# Patient Record
Sex: FEMALE | Race: WHITE | Employment: OTHER | URBAN - METROPOLITAN AREA
[De-identification: names, ages, dates, MRNs, and addresses within clinical notes are randomized per-mention and may not be internally consistent; named-entity substitution may affect disease eponyms.]

---

## 2017-04-11 ENCOUNTER — OFFICE VISIT (OUTPATIENT)
Dept: FAMILY MEDICINE CLINIC | Age: 82
End: 2017-04-11

## 2017-04-11 VITALS
BODY MASS INDEX: 28.82 KG/M2 | HEART RATE: 72 BPM | HEIGHT: 62 IN | TEMPERATURE: 96.5 F | SYSTOLIC BLOOD PRESSURE: 124 MMHG | RESPIRATION RATE: 12 BRPM | DIASTOLIC BLOOD PRESSURE: 56 MMHG | WEIGHT: 156.6 LBS

## 2017-04-11 DIAGNOSIS — G25.0 BENIGN ESSENTIAL TREMOR: ICD-10-CM

## 2017-04-11 DIAGNOSIS — R27.0 ATAXIA: ICD-10-CM

## 2017-04-11 DIAGNOSIS — G20 PARKINSON'S DISEASE (HCC): ICD-10-CM

## 2017-04-11 DIAGNOSIS — R29.6 FREQUENT FALLS: ICD-10-CM

## 2017-04-11 PROCEDURE — 1090F PRES/ABSN URINE INCON ASSESS: CPT | Performed by: FAMILY MEDICINE

## 2017-04-11 PROCEDURE — 99214 OFFICE O/P EST MOD 30 MIN: CPT | Performed by: FAMILY MEDICINE

## 2017-04-11 PROCEDURE — 4040F PNEUMOC VAC/ADMIN/RCVD: CPT | Performed by: FAMILY MEDICINE

## 2017-04-11 PROCEDURE — G8420 CALC BMI NORM PARAMETERS: HCPCS | Performed by: FAMILY MEDICINE

## 2017-04-11 PROCEDURE — 1123F ACP DISCUSS/DSCN MKR DOCD: CPT | Performed by: FAMILY MEDICINE

## 2017-04-11 PROCEDURE — G8400 PT W/DXA NO RESULTS DOC: HCPCS | Performed by: FAMILY MEDICINE

## 2017-04-11 PROCEDURE — 1036F TOBACCO NON-USER: CPT | Performed by: FAMILY MEDICINE

## 2017-04-11 PROCEDURE — G8427 DOCREV CUR MEDS BY ELIG CLIN: HCPCS | Performed by: FAMILY MEDICINE

## 2017-04-11 RX ORDER — DONEPEZIL HYDROCHLORIDE 10 MG/1
TABLET, FILM COATED ORAL
COMMUNITY
Start: 2017-01-19

## 2017-04-11 RX ORDER — SULFAMETHOXAZOLE AND TRIMETHOPRIM 800; 160 MG/1; MG/1
TABLET ORAL
COMMUNITY
Start: 2017-03-18

## 2017-04-11 ASSESSMENT — PATIENT HEALTH QUESTIONNAIRE - PHQ9
SUM OF ALL RESPONSES TO PHQ QUESTIONS 1-9: 0
1. LITTLE INTEREST OR PLEASURE IN DOING THINGS: 0
SUM OF ALL RESPONSES TO PHQ9 QUESTIONS 1 & 2: 0
2. FEELING DOWN, DEPRESSED OR HOPELESS: 0

## 2017-07-20 ENCOUNTER — OFFICE VISIT (OUTPATIENT)
Dept: FAMILY MEDICINE CLINIC | Age: 82
End: 2017-07-20

## 2017-07-20 VITALS
BODY MASS INDEX: 28.79 KG/M2 | WEIGHT: 157.4 LBS | OXYGEN SATURATION: 96 % | HEART RATE: 62 BPM | SYSTOLIC BLOOD PRESSURE: 132 MMHG | DIASTOLIC BLOOD PRESSURE: 60 MMHG | RESPIRATION RATE: 16 BRPM | TEMPERATURE: 97.9 F

## 2017-07-20 DIAGNOSIS — T78.3XXA ANGIOEDEMA, INITIAL ENCOUNTER: Primary | ICD-10-CM

## 2017-07-20 DIAGNOSIS — T78.40XA ALLERGIC REACTION, INITIAL ENCOUNTER: ICD-10-CM

## 2017-07-20 PROCEDURE — G8419 CALC BMI OUT NRM PARAM NOF/U: HCPCS | Performed by: NURSE PRACTITIONER

## 2017-07-20 PROCEDURE — G8400 PT W/DXA NO RESULTS DOC: HCPCS | Performed by: NURSE PRACTITIONER

## 2017-07-20 PROCEDURE — 96372 THER/PROPH/DIAG INJ SC/IM: CPT | Performed by: NURSE PRACTITIONER

## 2017-07-20 PROCEDURE — 1036F TOBACCO NON-USER: CPT | Performed by: NURSE PRACTITIONER

## 2017-07-20 PROCEDURE — 4040F PNEUMOC VAC/ADMIN/RCVD: CPT | Performed by: NURSE PRACTITIONER

## 2017-07-20 PROCEDURE — 1123F ACP DISCUSS/DSCN MKR DOCD: CPT | Performed by: NURSE PRACTITIONER

## 2017-07-20 PROCEDURE — 99214 OFFICE O/P EST MOD 30 MIN: CPT | Performed by: NURSE PRACTITIONER

## 2017-07-20 PROCEDURE — G8427 DOCREV CUR MEDS BY ELIG CLIN: HCPCS | Performed by: NURSE PRACTITIONER

## 2017-07-20 PROCEDURE — 1090F PRES/ABSN URINE INCON ASSESS: CPT | Performed by: NURSE PRACTITIONER

## 2017-07-20 RX ORDER — DIPHENHYDRAMINE HYDROCHLORIDE 50 MG/ML
25 INJECTION INTRAMUSCULAR; INTRAVENOUS ONCE
Status: COMPLETED | OUTPATIENT
Start: 2017-07-20 | End: 2017-07-20

## 2017-07-20 RX ORDER — TRIAMCINOLONE ACETONIDE 40 MG/ML
80 INJECTION, SUSPENSION INTRA-ARTICULAR; INTRAMUSCULAR ONCE
Status: COMPLETED | OUTPATIENT
Start: 2017-07-20 | End: 2017-07-20

## 2017-07-20 RX ADMIN — TRIAMCINOLONE ACETONIDE 80 MG: 40 INJECTION, SUSPENSION INTRA-ARTICULAR; INTRAMUSCULAR at 16:30

## 2017-07-20 RX ADMIN — DIPHENHYDRAMINE HYDROCHLORIDE 25 MG: 50 INJECTION INTRAMUSCULAR; INTRAVENOUS at 16:45

## 2017-07-20 ASSESSMENT — ENCOUNTER SYMPTOMS
CHOKING: 0
SHORTNESS OF BREATH: 0
STRIDOR: 0
ABDOMINAL PAIN: 0
ALLERGIC REACTION: 1
CHEST TIGHTNESS: 0
TROUBLE SWALLOWING: 0
DIFFICULTY BREATHING: 0
EYE PAIN: 0
EYE ITCHING: 0
DIARRHEA: 0
EYE REDNESS: 0
VOMITING: 0
HYPERVENTILATION: 0
WHEEZING: 0
COUGH: 0
GLOBUS SENSATION: 0
EYE WATERING: 0

## 2017-10-10 ENCOUNTER — OFFICE VISIT (OUTPATIENT)
Dept: FAMILY MEDICINE CLINIC | Age: 82
End: 2017-10-10

## 2017-10-10 VITALS
DIASTOLIC BLOOD PRESSURE: 56 MMHG | HEIGHT: 62 IN | BODY MASS INDEX: 28.19 KG/M2 | TEMPERATURE: 96.8 F | HEART RATE: 65 BPM | OXYGEN SATURATION: 98 % | SYSTOLIC BLOOD PRESSURE: 112 MMHG | WEIGHT: 153.2 LBS

## 2017-10-10 DIAGNOSIS — R29.6 FREQUENT FALLS: ICD-10-CM

## 2017-10-10 DIAGNOSIS — Z23 NEED FOR PNEUMOCOCCAL VACCINATION: ICD-10-CM

## 2017-10-10 DIAGNOSIS — R27.0 ATAXIA: ICD-10-CM

## 2017-10-10 DIAGNOSIS — Z23 NEEDS FLU SHOT: ICD-10-CM

## 2017-10-10 DIAGNOSIS — G20 PARKINSON'S DISEASE (HCC): Primary | ICD-10-CM

## 2017-10-10 PROCEDURE — G8484 FLU IMMUNIZE NO ADMIN: HCPCS | Performed by: FAMILY MEDICINE

## 2017-10-10 PROCEDURE — G8427 DOCREV CUR MEDS BY ELIG CLIN: HCPCS | Performed by: FAMILY MEDICINE

## 2017-10-10 PROCEDURE — 4040F PNEUMOC VAC/ADMIN/RCVD: CPT | Performed by: FAMILY MEDICINE

## 2017-10-10 PROCEDURE — 1090F PRES/ABSN URINE INCON ASSESS: CPT | Performed by: FAMILY MEDICINE

## 2017-10-10 PROCEDURE — 1123F ACP DISCUSS/DSCN MKR DOCD: CPT | Performed by: FAMILY MEDICINE

## 2017-10-10 PROCEDURE — G8417 CALC BMI ABV UP PARAM F/U: HCPCS | Performed by: FAMILY MEDICINE

## 2017-10-10 PROCEDURE — 90662 IIV NO PRSV INCREASED AG IM: CPT | Performed by: FAMILY MEDICINE

## 2017-10-10 PROCEDURE — 1036F TOBACCO NON-USER: CPT | Performed by: FAMILY MEDICINE

## 2017-10-10 PROCEDURE — 90732 PPSV23 VACC 2 YRS+ SUBQ/IM: CPT | Performed by: FAMILY MEDICINE

## 2017-10-10 PROCEDURE — 99213 OFFICE O/P EST LOW 20 MIN: CPT | Performed by: FAMILY MEDICINE

## 2017-10-10 PROCEDURE — G8400 PT W/DXA NO RESULTS DOC: HCPCS | Performed by: FAMILY MEDICINE

## 2017-10-10 PROCEDURE — G0009 ADMIN PNEUMOCOCCAL VACCINE: HCPCS | Performed by: FAMILY MEDICINE

## 2017-10-10 PROCEDURE — G0008 ADMIN INFLUENZA VIRUS VAC: HCPCS | Performed by: FAMILY MEDICINE

## 2017-10-10 RX ORDER — OMEPRAZOLE 40 MG/1
1 CAPSULE, DELAYED RELEASE ORAL DAILY
COMMUNITY
Start: 2017-09-09

## 2017-10-10 NOTE — PROGRESS NOTES
hematuria  Musculoskeletal ROS: negative  Neurological ROS: no TIA or stroke symptoms  Dermatological ROS: negative    Vitals:    10/10/17 1038 10/10/17 1116   BP: 136/78 (!) 112/56   Pulse: 65    Temp: 96.8 °F (36 °C)    TempSrc: Temporal    SpO2: 98%    Weight: 153 lb 3.2 oz (69.5 kg)    Height: 5' 2\" (1.575 m)      Physical Examination: General appearance - alert, well appearing, and in no distress, normal appearing weight and acyanotic, in no respiratory distress. She does walk with a cane. Mental status - alert, oriented to person, place, and time  Neck - supple, no significant adenopathy, carotids upstroke normal bilaterally, no bruits, thyroid exam: thyroid is normal in size without nodules or tenderness  Chest - clear to auscultation, no wheezes, rales or rhonchi, symmetric air entry  Heart - normal rate, regular rhythm, normal S1, S2, no murmurs, rubs, clicks or gallops, systolic murmur 2/6 at 2nd right intercostal space  Abdomen - soft, nontender, nondistended, no masses or organomegaly  bowel sounds normal  Neurological - alert, oriented, normal speech, no focal findings or movement disorder noted, cranial nerves II through XII intact, mild tremor is noted. Extremities - peripheral pulses normal, no pedal edema, no clubbing or cyanosis  Skin - normal coloration and turgor, no rashes, no suspicious skin lesions noted    1. Parkinson's disease ([address]     2. Ataxia     3. Frequent falls     4. Needs flu shot  INFLUENZA, HIGH DOSE, 65 YRS +, IM, PF, PREFILL SYR, 0.5ML (FLUZONE HD)   5. Need for pneumococcal vaccination  Pneumococcal polysaccharide vaccine 23-valent greater than or equal to 1yo subcutaneous/IM       I have reviewed the following diagnostic data: labs. Please see report for additional information. Follow up with neurology as scheduled. Patient was encouraged to continue with exercising on a regular basis. She will continue to use her quad cane for additional support.   Return in about 6 months (around 4/10/2018) for follow up on medications.

## 2017-10-10 NOTE — PROGRESS NOTES
Vaccine Information Sheet, \"Influenza - Inactivated\" OR \"Live - Intranasal\"  given to [facility]. Patient responses:    Have you ever had a reaction to a flu vaccine? No  Are you able to eat eggs without adverse effects? Yes  Do you have any current illness? No  Have you ever had Guillian Hudson Syndrome? No    Flu vaccine given per order. Please see immunization tab.

## 2018-04-23 ENCOUNTER — OFFICE VISIT (OUTPATIENT)
Dept: FAMILY MEDICINE CLINIC | Age: 83
End: 2018-04-23
Payer: MEDICARE

## 2018-04-23 VITALS
OXYGEN SATURATION: 95 % | TEMPERATURE: 97.8 F | WEIGHT: 141.6 LBS | HEIGHT: 62 IN | SYSTOLIC BLOOD PRESSURE: 118 MMHG | DIASTOLIC BLOOD PRESSURE: 66 MMHG | HEART RATE: 69 BPM | BODY MASS INDEX: 26.06 KG/M2

## 2018-04-23 DIAGNOSIS — R33.9 URINARY RETENTION: ICD-10-CM

## 2018-04-23 DIAGNOSIS — Z13.220 SCREENING, LIPID: ICD-10-CM

## 2018-04-23 DIAGNOSIS — G25.0 BENIGN ESSENTIAL TREMOR: ICD-10-CM

## 2018-04-23 DIAGNOSIS — G20 PARKINSON'S DISEASE (HCC): ICD-10-CM

## 2018-04-23 DIAGNOSIS — G20 PARKINSON'S DISEASE (HCC): Primary | ICD-10-CM

## 2018-04-23 LAB
ALBUMIN SERPL-MCNC: 4.5 G/DL (ref 3.9–4.9)
ALP BLD-CCNC: 55 U/L (ref 40–130)
ALT SERPL-CCNC: 13 U/L (ref 0–33)
ANION GAP SERPL CALCULATED.3IONS-SCNC: 14 MEQ/L (ref 7–13)
AST SERPL-CCNC: 20 U/L (ref 0–35)
BASOPHILS ABSOLUTE: 0 K/UL (ref 0–0.2)
BASOPHILS RELATIVE PERCENT: 0.4 %
BILIRUB SERPL-MCNC: 0.6 MG/DL (ref 0–1.2)
BUN BLDV-MCNC: 27 MG/DL (ref 8–23)
CALCIUM SERPL-MCNC: 10 MG/DL (ref 8.6–10.2)
CHLORIDE BLD-SCNC: 101 MEQ/L (ref 98–107)
CHOLESTEROL, TOTAL: 201 MG/DL (ref 0–199)
CO2: 29 MEQ/L (ref 22–29)
CREAT SERPL-MCNC: 0.93 MG/DL (ref 0.5–0.9)
EOSINOPHILS ABSOLUTE: 0.1 K/UL (ref 0–0.7)
EOSINOPHILS RELATIVE PERCENT: 1 %
GFR AFRICAN AMERICAN: >60
GFR NON-AFRICAN AMERICAN: 57.9
GLOBULIN: 2.5 G/DL (ref 2.3–3.5)
GLUCOSE BLD-MCNC: 93 MG/DL (ref 74–109)
HCT VFR BLD CALC: 39.2 % (ref 37–47)
HDLC SERPL-MCNC: 73 MG/DL (ref 40–59)
HEMOGLOBIN: 13.1 G/DL (ref 12–16)
LDL CHOLESTEROL CALCULATED: 118 MG/DL (ref 0–129)
LYMPHOCYTES ABSOLUTE: 1.3 K/UL (ref 1–4.8)
LYMPHOCYTES RELATIVE PERCENT: 25.3 %
MCH RBC QN AUTO: 32.2 PG (ref 27–31.3)
MCHC RBC AUTO-ENTMCNC: 33.5 % (ref 33–37)
MCV RBC AUTO: 95.9 FL (ref 82–100)
MONOCYTES ABSOLUTE: 0.5 K/UL (ref 0.2–0.8)
MONOCYTES RELATIVE PERCENT: 9.6 %
NEUTROPHILS ABSOLUTE: 3.3 K/UL (ref 1.4–6.5)
NEUTROPHILS RELATIVE PERCENT: 63.7 %
PDW BLD-RTO: 15 % (ref 11.5–14.5)
PLATELET # BLD: 149 K/UL (ref 130–400)
POTASSIUM SERPL-SCNC: 5.2 MEQ/L (ref 3.5–5.1)
RBC # BLD: 4.09 M/UL (ref 4.2–5.4)
SODIUM BLD-SCNC: 144 MEQ/L (ref 132–144)
TOTAL PROTEIN: 7 G/DL (ref 6.4–8.1)
TRIGL SERPL-MCNC: 51 MG/DL (ref 0–200)
TSH SERPL DL<=0.05 MIU/L-ACNC: 2.65 UIU/ML (ref 0.27–4.2)
WBC # BLD: 5.1 K/UL (ref 4.8–10.8)

## 2018-04-23 PROCEDURE — G8427 DOCREV CUR MEDS BY ELIG CLIN: HCPCS | Performed by: FAMILY MEDICINE

## 2018-04-23 PROCEDURE — 1123F ACP DISCUSS/DSCN MKR DOCD: CPT | Performed by: FAMILY MEDICINE

## 2018-04-23 PROCEDURE — 1036F TOBACCO NON-USER: CPT | Performed by: FAMILY MEDICINE

## 2018-04-23 PROCEDURE — 99213 OFFICE O/P EST LOW 20 MIN: CPT | Performed by: FAMILY MEDICINE

## 2018-04-23 PROCEDURE — G8417 CALC BMI ABV UP PARAM F/U: HCPCS | Performed by: FAMILY MEDICINE

## 2018-04-23 PROCEDURE — 4040F PNEUMOC VAC/ADMIN/RCVD: CPT | Performed by: FAMILY MEDICINE

## 2018-04-23 PROCEDURE — 1090F PRES/ABSN URINE INCON ASSESS: CPT | Performed by: FAMILY MEDICINE

## 2018-04-23 PROCEDURE — G8400 PT W/DXA NO RESULTS DOC: HCPCS | Performed by: FAMILY MEDICINE

## 2018-04-23 ASSESSMENT — PATIENT HEALTH QUESTIONNAIRE - PHQ9
1. LITTLE INTEREST OR PLEASURE IN DOING THINGS: 0
2. FEELING DOWN, DEPRESSED OR HOPELESS: 0
SUM OF ALL RESPONSES TO PHQ QUESTIONS 1-9: 0
SUM OF ALL RESPONSES TO PHQ9 QUESTIONS 1 & 2: 0

## 2018-10-23 ENCOUNTER — OFFICE VISIT (OUTPATIENT)
Dept: FAMILY MEDICINE CLINIC | Age: 83
End: 2018-10-23
Payer: MEDICARE

## 2018-10-23 VITALS
HEART RATE: 72 BPM | RESPIRATION RATE: 18 BRPM | TEMPERATURE: 97 F | HEIGHT: 62 IN | WEIGHT: 139.4 LBS | BODY MASS INDEX: 25.65 KG/M2 | DIASTOLIC BLOOD PRESSURE: 62 MMHG | SYSTOLIC BLOOD PRESSURE: 112 MMHG

## 2018-10-23 DIAGNOSIS — R27.0 ATAXIA: ICD-10-CM

## 2018-10-23 DIAGNOSIS — N28.9 RENAL INSUFFICIENCY: ICD-10-CM

## 2018-10-23 DIAGNOSIS — R29.6 FREQUENT FALLS: ICD-10-CM

## 2018-10-23 DIAGNOSIS — G20 PARKINSON'S DISEASE (HCC): Primary | ICD-10-CM

## 2018-10-23 DIAGNOSIS — Z23 NEEDS FLU SHOT: ICD-10-CM

## 2018-10-23 DIAGNOSIS — Z91.81 AT HIGH RISK FOR FALLS: ICD-10-CM

## 2018-10-23 DIAGNOSIS — Z78.0 POSTMENOPAUSAL: ICD-10-CM

## 2018-10-23 DIAGNOSIS — Z23 NEED FOR SHINGLES VACCINE: ICD-10-CM

## 2018-10-23 DIAGNOSIS — Z13.220 SCREENING, LIPID: ICD-10-CM

## 2018-10-23 LAB
ANION GAP SERPL CALCULATED.3IONS-SCNC: 10 MEQ/L (ref 7–13)
BUN BLDV-MCNC: 29 MG/DL (ref 8–23)
CALCIUM SERPL-MCNC: 9.7 MG/DL (ref 8.6–10.2)
CHLORIDE BLD-SCNC: 104 MEQ/L (ref 98–107)
CO2: 31 MEQ/L (ref 22–29)
CREAT SERPL-MCNC: 0.94 MG/DL (ref 0.5–0.9)
GFR AFRICAN AMERICAN: >60
GFR NON-AFRICAN AMERICAN: 57.2
GLUCOSE BLD-MCNC: 97 MG/DL (ref 74–109)
POTASSIUM SERPL-SCNC: 5.3 MEQ/L (ref 3.5–5.1)
SODIUM BLD-SCNC: 145 MEQ/L (ref 132–144)

## 2018-10-23 PROCEDURE — G8400 PT W/DXA NO RESULTS DOC: HCPCS | Performed by: FAMILY MEDICINE

## 2018-10-23 PROCEDURE — G0008 ADMIN INFLUENZA VIRUS VAC: HCPCS | Performed by: FAMILY MEDICINE

## 2018-10-23 PROCEDURE — 1101F PT FALLS ASSESS-DOCD LE1/YR: CPT | Performed by: FAMILY MEDICINE

## 2018-10-23 PROCEDURE — 1090F PRES/ABSN URINE INCON ASSESS: CPT | Performed by: FAMILY MEDICINE

## 2018-10-23 PROCEDURE — 4040F PNEUMOC VAC/ADMIN/RCVD: CPT | Performed by: FAMILY MEDICINE

## 2018-10-23 PROCEDURE — 1123F ACP DISCUSS/DSCN MKR DOCD: CPT | Performed by: FAMILY MEDICINE

## 2018-10-23 PROCEDURE — 99214 OFFICE O/P EST MOD 30 MIN: CPT | Performed by: FAMILY MEDICINE

## 2018-10-23 PROCEDURE — G8417 CALC BMI ABV UP PARAM F/U: HCPCS | Performed by: FAMILY MEDICINE

## 2018-10-23 PROCEDURE — 1036F TOBACCO NON-USER: CPT | Performed by: FAMILY MEDICINE

## 2018-10-23 PROCEDURE — 90662 IIV NO PRSV INCREASED AG IM: CPT | Performed by: FAMILY MEDICINE

## 2018-10-23 PROCEDURE — G8482 FLU IMMUNIZE ORDER/ADMIN: HCPCS | Performed by: FAMILY MEDICINE

## 2018-10-23 PROCEDURE — G8427 DOCREV CUR MEDS BY ELIG CLIN: HCPCS | Performed by: FAMILY MEDICINE

## 2018-10-23 ASSESSMENT — ENCOUNTER SYMPTOMS
SHORTNESS OF BREATH: 0
WHEEZING: 0
COLOR CHANGE: 0
RHINORRHEA: 0
CONSTIPATION: 0
SINUS PAIN: 0
DIARRHEA: 0
COUGH: 0
SORE THROAT: 0
ABDOMINAL PAIN: 0
CHEST TIGHTNESS: 0
NAUSEA: 0
BLOOD IN STOOL: 0
VOMITING: 0
VOICE CHANGE: 0
TROUBLE SWALLOWING: 0

## 2018-10-23 NOTE — PROGRESS NOTES
Chief Complaint   Patient presents with    Follow-up     LOV 04/23/2018    Other     Parkinson's Disease    Tremors    Urinary Retention    Discuss Medications     HPI: Mia Gonsalez is a [de-identified] y.o. female presenting for follow-up of Parkinson's Disease, Tremors, and Urinary Retention. I last saw the patient 04/23/2018. She did finish a round of PT for balance and strengthening. She has had no falls since her last visit. She is urinating fine. She is seeing Dr. Mara Gerard with [id number]. She is eating well and does have a good appetite. Current Outpatient Prescriptions on File Prior to Visit   Medication Sig Dispense Refill    omeprazole (PRILOSEC) 40 MG delayed release capsule Take 1 tablet by mouth daily      sulfamethoxazole-trimethoprim (BACTRIM DS;SEPTRA DS) 800-160 MG per tablet       donepezil (ARICEPT) 10 MG tablet       trospium chloride (SANCTURA XR) 60 MG CP24 Take 60 mg by mouth daily      Ivermectin 1 % CREA Apply topically      Multiple Vitamin (MULTI-VITAMIN DAILY PO) Take by mouth      Calcium Citrate-Vitamin D (CITRACAL/VITAMIN D PO) Take by mouth      terazosin (HYTRIN) 2 MG capsule Take 2 mg by mouth nightly      clonazePAM (KLONOPIN) 0.5 MG tablet Take 0.25 mg by mouth 4 times daily       No current facility-administered medications on file prior to visit. No past medical history on file. Past Surgical History:   Procedure Laterality Date    CHOLECYSTECTOMY      FOOT SURGERY      INCONTINENCE SURGERY  6-15-15    Dr. Willams Bruning  04/24/12    DR Angie De Anda       family history includes Heart Disease in her father; High Blood Pressure in her father and sister. Social History     Social History    Marital status:      Spouse name: N/A    Number of children: N/A    Years of education: N/A     Occupational History    Not on file.      Social History Main Topics    Smoking status: Never Smoker    Smokeless tobacco: Never Used

## 2019-02-11 ENCOUNTER — TELEPHONE (OUTPATIENT)
Dept: FAMILY MEDICINE CLINIC | Age: 84
End: 2019-02-11

## 2019-03-04 ENCOUNTER — TELEPHONE (OUTPATIENT)
Dept: ADMINISTRATIVE | Age: 84
End: 2019-03-04

## 2019-04-02 ENCOUNTER — TELEPHONE (OUTPATIENT)
Dept: ADMINISTRATIVE | Age: 84
End: 2019-04-02

## 2023-05-30 PROBLEM — R32 URINARY INCONTINENCE: Status: ACTIVE | Noted: 2023-05-30

## 2023-05-30 PROBLEM — R39.15 URGENCY OF URINATION: Status: ACTIVE | Noted: 2023-05-30

## 2023-05-30 PROBLEM — D53.9 MACROCYTIC ANEMIA: Status: ACTIVE | Noted: 2023-05-30

## 2023-05-30 PROBLEM — E78.00 PURE HYPERCHOLESTEROLEMIA: Status: ACTIVE | Noted: 2023-05-30

## 2023-05-30 PROBLEM — R33.9 URINARY RETENTION: Status: ACTIVE | Noted: 2023-05-30

## 2023-05-30 PROBLEM — H00.012 HORDEOLUM EXTERNUM OF RIGHT LOWER EYELID: Status: ACTIVE | Noted: 2023-05-30

## 2023-05-30 PROBLEM — G20.C PARKINSONISM (MULTI): Status: ACTIVE | Noted: 2023-05-30

## 2023-05-30 PROBLEM — N18.31 CHRONIC KIDNEY DISEASE, STAGE 3A (MULTI): Status: ACTIVE | Noted: 2023-05-30

## 2023-05-30 PROBLEM — N28.1 RENAL CYSTS, ACQUIRED, BILATERAL: Status: ACTIVE | Noted: 2023-05-30

## 2023-05-30 PROBLEM — R26.9 ABNORMALITY OF GAIT AND MOBILITY: Status: ACTIVE | Noted: 2023-05-30

## 2023-05-30 PROBLEM — S20.229A CONTUSION OF BACK, INITIAL ENCOUNTER: Status: ACTIVE | Noted: 2023-05-30

## 2023-05-30 PROBLEM — N28.9 RENAL INSUFFICIENCY, MILD: Status: ACTIVE | Noted: 2023-05-30

## 2023-05-30 PROBLEM — R55 SYNCOPE: Status: ACTIVE | Noted: 2023-05-30

## 2023-05-30 PROBLEM — R41.89 COGNITIVE DECLINE: Status: ACTIVE | Noted: 2023-05-30

## 2023-05-30 PROBLEM — K21.9 GASTROESOPHAGEAL REFLUX DISEASE WITHOUT ESOPHAGITIS: Status: ACTIVE | Noted: 2023-05-30

## 2023-05-30 PROBLEM — F41.9 ANXIETY DISORDER: Status: ACTIVE | Noted: 2023-05-30

## 2023-05-30 PROBLEM — F03.90 DEMENTIA (MULTI): Status: ACTIVE | Noted: 2023-05-30

## 2023-05-30 PROBLEM — R35.1 NOCTURIA: Status: ACTIVE | Noted: 2023-05-30

## 2023-05-30 PROBLEM — N39.41 URGE INCONTINENCE OF URINE: Status: ACTIVE | Noted: 2023-05-30

## 2023-05-30 PROBLEM — N39.0 URINARY TRACT INFECTION: Status: ACTIVE | Noted: 2023-05-30

## 2023-05-30 PROBLEM — R30.0 DYSURIA: Status: ACTIVE | Noted: 2023-05-30

## 2023-05-30 PROBLEM — N32.89 BLADDER INSTABILITY: Status: ACTIVE | Noted: 2023-05-30

## 2023-05-30 PROBLEM — R41.3 MEMORY LOSS, SHORT TERM: Status: ACTIVE | Noted: 2023-05-30

## 2023-05-30 RX ORDER — TROSPIUM CHLORIDE ER 60 MG/1
CAPSULE ORAL
COMMUNITY
Start: 2021-11-05 | End: 2023-12-30

## 2023-05-30 RX ORDER — MIRABEGRON 50 MG/1
1 TABLET, EXTENDED RELEASE ORAL DAILY
COMMUNITY
Start: 2022-08-25

## 2023-05-30 RX ORDER — OMEPRAZOLE 40 MG/1
CAPSULE, DELAYED RELEASE ORAL
COMMUNITY
Start: 2014-09-03

## 2023-05-30 RX ORDER — SULFAMETHOXAZOLE AND TRIMETHOPRIM 800; 160 MG/1; MG/1
TABLET ORAL
COMMUNITY
Start: 2022-06-15 | End: 2024-02-12

## 2023-05-30 RX ORDER — DONEPEZIL HYDROCHLORIDE 10 MG/1
1 TABLET, FILM COATED ORAL DAILY
COMMUNITY
Start: 2016-10-26 | End: 2024-01-02

## 2023-05-30 RX ORDER — CARBIDOPA AND LEVODOPA 25; 100 MG/1; MG/1
TABLET ORAL
COMMUNITY
Start: 2021-11-29 | End: 2023-12-15

## 2023-05-31 NOTE — PROGRESS NOTES
"Subjective   Patient ID: Nedra Quiles is a 87 y.o. female who presents for Dementia, GERD, Chronic Kidney Disease, and Follow-up.  I last saw the patient on 12/1/2022. Her daughter is with her today.     HPI   Patient has no medical concerns today.    She does at least 1 protein powder drink daily to try and keep her weight stable.     Review of Systems  Except positives as noted in the CC & HPI      Constitutional: Denies fevers, chills, night sweats, fatigue, weight changes, change in appetite    Eyes: Denies blurry vision, double vision    ENT: Denies otalgia, trouble hearing, tinnitus, vertigo, nasal congestion, rhinorrhea, sore throat    Neck: Denies swelling, masses    Cardiovascular: Denies chest pain, palpitations, edema, orthopnea, syncope    Respiratory: Denies dyspnea, cough, wheezing, postural nocturnal dyspnea    Gastrointestinal: Denies abdominal pain, nausea, vomiting, diarrhea, constipation, melena, hematochezia    Genitourinary: Denies dysuria, hematuria, frequency, urgency    Musculoskeletal: Denies back pain, neck pain, arthralgias, myalgias    Integumentary: Denies skin lesions, rashes, masses    Neurological: Denies dizziness, headaches, confusion, limb weakness, paresthesias, syncope, convulsions    Psychiatric: Denies depression, anxiety, homicidal ideations, suicidal ideations, sleep disturbances    Endocrine: Denies polyphagia, polydipsia, polyuria, weakness, hair thinning, heat intolerance, cold intolerance, weight changes    Heme/Lymph: Denies easy bruising, easy bleeding, swollen glands     Objective   /62 (BP Location: Right arm, Patient Position: Sitting)   Temp 36 °C (96.8 °F)   Resp 16   Ht 1.575 m (5' 2\")   Wt (!) 44.6 kg (98 lb 6.4 oz)   BMI 18.00 kg/m²     Physical Exam  138/62 on recheck of BP in the right arm.     Gen. Appearance - well-developed, thin, 87 y.o., White female in no acute distress. Patient is mildly unsteady on her feet.     Skin - warm, pink and dry " without rash or concerning lesions.    Mental Status - alert and oriented times 3. Normal mood and affect appropriate to mood.     Neck - supple without lymphadenopathy. Carotid pulses are normal without bruits. Thyroid is normal in midline without nodules.    Chest - lungs are clear to auscultation without rales, rhonchi or wheezes.    Heart - regular, rate, and rhythm without murmurs, rubs or gallops. Grade 1/6 systolic ejection murmur heard best at right sternal border, second intercostal space.     Abdomen - soft, scaphoid, nontender, nondistended. No masses, hepatomegaly or splenomegaly is noted. No rebound, rigidity or guarding is noted. Bowel sounds are normoactive.     Extremities - no cyanosis, clubbing or edema. Pedal pulses are 2+ normal at the dorsalis pedis and posterior tibial pulses bilaterally.     Neurological - cranial nerves II through XII are grossly intact. Motor strength 5/5 at all fours.     Assessment/Plan   1. Moderate dementia without behavioral disturbance, psychotic disturbance, mood disturbance, or anxiety, unspecified dementia type (CMS/HCC)  Follow Up In [facility] - PCP      2. Parkinson's disease (CMS/HCC)  Follow Up In [facility] - PCP      3. Gastroesophageal reflux disease without esophagitis        4. Chronic kidney disease, stage 3a        5. Generalized anxiety disorder  Follow Up In [facility] - PCP      6. Pure hypercholesterolemia        7. Postmenopausal  XR DEXA bone density      8. Mixed stress and urge urinary incontinence        9. Protein-calorie malnutrition, unspecified severity (CMS/HCC)        Patient to continue current medications (with any exceptions as noted) and diet. Follow-up in 6 month(s) otherwise as needed.      DEXA scan ordered to screen for osteopenia/osteoporosis.     Patient is to follow up with Dia Solis (urology CNP), Dr. Marinelli (neuro) as scheduled.     Recommend patient get Shingrix vaccine at local pharmacy.  Shingrix is a 2-step vaccine. You will receive the second dose 2-6 months after the first. This vaccine is 90-95% effective and you should not need another dose in your lifetime. It is recommended by CDC for everyone over the age of 50.         Scribe Attestation  By signing my name below, IAshley Scribe   attest that this documentation has been prepared under the direction and in the presence of Boston Peters MD.

## 2023-06-01 ENCOUNTER — OFFICE VISIT (OUTPATIENT)
Dept: PRIMARY CARE | Facility: CLINIC | Age: 88
End: 2023-06-01
Payer: MEDICARE

## 2023-06-01 VITALS
BODY MASS INDEX: 18.11 KG/M2 | WEIGHT: 98.4 LBS | DIASTOLIC BLOOD PRESSURE: 62 MMHG | SYSTOLIC BLOOD PRESSURE: 138 MMHG | HEIGHT: 62 IN | TEMPERATURE: 96.8 F | RESPIRATION RATE: 16 BRPM

## 2023-06-01 DIAGNOSIS — F03.B0 MODERATE DEMENTIA WITHOUT BEHAVIORAL DISTURBANCE, PSYCHOTIC DISTURBANCE, MOOD DISTURBANCE, OR ANXIETY, UNSPECIFIED DEMENTIA TYPE (MULTI): Primary | ICD-10-CM

## 2023-06-01 DIAGNOSIS — E78.00 PURE HYPERCHOLESTEROLEMIA: ICD-10-CM

## 2023-06-01 DIAGNOSIS — K21.9 GASTROESOPHAGEAL REFLUX DISEASE WITHOUT ESOPHAGITIS: ICD-10-CM

## 2023-06-01 DIAGNOSIS — G20.A1 PARKINSON'S DISEASE (MULTI): ICD-10-CM

## 2023-06-01 DIAGNOSIS — Z78.0 POSTMENOPAUSAL: ICD-10-CM

## 2023-06-01 DIAGNOSIS — N39.46 MIXED STRESS AND URGE URINARY INCONTINENCE: ICD-10-CM

## 2023-06-01 DIAGNOSIS — F41.1 GENERALIZED ANXIETY DISORDER: ICD-10-CM

## 2023-06-01 DIAGNOSIS — N18.31 CHRONIC KIDNEY DISEASE, STAGE 3A (MULTI): ICD-10-CM

## 2023-06-01 DIAGNOSIS — E46 PROTEIN-CALORIE MALNUTRITION, UNSPECIFIED SEVERITY (MULTI): ICD-10-CM

## 2023-06-01 PROCEDURE — 99213 OFFICE O/P EST LOW 20 MIN: CPT | Performed by: FAMILY MEDICINE

## 2023-06-01 PROCEDURE — 1036F TOBACCO NON-USER: CPT | Performed by: FAMILY MEDICINE

## 2023-06-01 PROCEDURE — 1170F FXNL STATUS ASSESSED: CPT | Performed by: FAMILY MEDICINE

## 2023-06-01 PROCEDURE — 1159F MED LIST DOCD IN RCRD: CPT | Performed by: FAMILY MEDICINE

## 2023-06-01 PROCEDURE — 1126F AMNT PAIN NOTED NONE PRSNT: CPT | Performed by: FAMILY MEDICINE

## 2023-06-01 ASSESSMENT — LIFESTYLE VARIABLES
HOW OFTEN DO YOU HAVE SIX OR MORE DRINKS ON ONE OCCASION: NEVER
HOW MANY STANDARD DRINKS CONTAINING ALCOHOL DO YOU HAVE ON A TYPICAL DAY: PATIENT DOES NOT DRINK
AUDIT-C TOTAL SCORE: 0
SKIP TO QUESTIONS 9-10: 1
HOW OFTEN DO YOU HAVE A DRINK CONTAINING ALCOHOL: NEVER

## 2023-06-01 ASSESSMENT — ENCOUNTER SYMPTOMS
DEPRESSION: 0
OCCASIONAL FEELINGS OF UNSTEADINESS: 0
LOSS OF SENSATION IN FEET: 0

## 2023-06-01 ASSESSMENT — ACTIVITIES OF DAILY LIVING (ADL)
GROCERY_SHOPPING: INDEPENDENT
MANAGING_FINANCES: INDEPENDENT
BATHING: INDEPENDENT
TAKING_MEDICATION: INDEPENDENT
DOING_HOUSEWORK: INDEPENDENT
DRESSING: INDEPENDENT

## 2023-06-01 ASSESSMENT — PATIENT HEALTH QUESTIONNAIRE - PHQ9
SUM OF ALL RESPONSES TO PHQ9 QUESTIONS 1 & 2: 0
1. LITTLE INTEREST OR PLEASURE IN DOING THINGS: NOT AT ALL
2. FEELING DOWN, DEPRESSED OR HOPELESS: NOT AT ALL

## 2023-06-01 NOTE — ASSESSMENT & PLAN NOTE
Condition is stable. Patient is to continue current medications and regimen. Patient is to follow up with neurology to monitor his/her condition at least once annually.

## 2023-06-01 NOTE — PATIENT INSTRUCTIONS
Patient to continue current medications (with any exceptions as noted) and diet. Follow-up in 6 month(s) otherwise as needed.      DEXA scan ordered to screen for osteopenia/osteoporosis.     Patient is to follow up with Dia Solis (urology CNP), Dr. Marinelli (neuro) as scheduled.     Recommend patient get Shingrix vaccine at local pharmacy. Shingrix is a 2-step vaccine. You will receive the second dose 2-6 months after the first. This vaccine is 90-95% effective and you should not need another dose in your lifetime. It is recommended by CDC for everyone over the age of 50.

## 2023-07-24 NOTE — RESULT ENCOUNTER NOTE
Please call the patient regarding her abnormal result.  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 Recommend the patient take calcium 600 mg by mouth twice a day and vitamin D 1000 international units daily. Patient should also consider Fosamax, Actonel, Boniva or Reclast to help reduce future fracture risk.

## 2023-08-14 DIAGNOSIS — M85.852 OSTEOPENIA OF BOTH HIPS: Primary | ICD-10-CM

## 2023-08-14 DIAGNOSIS — M85.851 OSTEOPENIA OF BOTH HIPS: Primary | ICD-10-CM

## 2023-08-23 DIAGNOSIS — M85.80 OSTEOPENIA, UNSPECIFIED LOCATION: Primary | ICD-10-CM

## 2023-08-23 DIAGNOSIS — M85.852 OSTEOPENIA OF BOTH HIPS: Primary | ICD-10-CM

## 2023-08-23 DIAGNOSIS — M85.851 OSTEOPENIA OF BOTH HIPS: Primary | ICD-10-CM

## 2023-08-23 RX ORDER — ALENDRONATE SODIUM 70 MG/1
70 TABLET ORAL
COMMUNITY
End: 2023-08-23 | Stop reason: SDUPTHER

## 2023-08-23 RX ORDER — ALENDRONATE SODIUM 70 MG/1
70 TABLET ORAL
Qty: 12 TABLET | Refills: 0 | Status: CANCELLED | OUTPATIENT
Start: 2023-08-23 | End: 2023-11-21

## 2023-08-23 RX ORDER — ALENDRONATE SODIUM 70 MG/1
70 TABLET ORAL
Qty: 12 TABLET | Refills: 0 | Status: SHIPPED | OUTPATIENT
Start: 2023-08-23 | End: 2023-11-15

## 2023-12-01 ENCOUNTER — LAB (OUTPATIENT)
Dept: LAB | Facility: LAB | Age: 88
End: 2023-12-01
Payer: MEDICARE

## 2023-12-01 ENCOUNTER — OFFICE VISIT (OUTPATIENT)
Dept: PRIMARY CARE | Facility: CLINIC | Age: 88
End: 2023-12-01
Payer: MEDICARE

## 2023-12-01 VITALS
WEIGHT: 100.2 LBS | SYSTOLIC BLOOD PRESSURE: 124 MMHG | OXYGEN SATURATION: 97 % | BODY MASS INDEX: 18.44 KG/M2 | TEMPERATURE: 97.8 F | DIASTOLIC BLOOD PRESSURE: 52 MMHG | HEART RATE: 57 BPM | HEIGHT: 62 IN

## 2023-12-01 DIAGNOSIS — F41.1 GENERALIZED ANXIETY DISORDER: ICD-10-CM

## 2023-12-01 DIAGNOSIS — K21.9 GASTROESOPHAGEAL REFLUX DISEASE WITHOUT ESOPHAGITIS: ICD-10-CM

## 2023-12-01 DIAGNOSIS — G20.A1 PARKINSON'S DISEASE, UNSPECIFIED WHETHER DYSKINESIA PRESENT, UNSPECIFIED WHETHER MANIFESTATIONS FLUCTUATE (MULTI): ICD-10-CM

## 2023-12-01 DIAGNOSIS — N18.31 CHRONIC KIDNEY DISEASE, STAGE 3A (MULTI): ICD-10-CM

## 2023-12-01 DIAGNOSIS — R19.7 DIARRHEA, UNSPECIFIED TYPE: Primary | ICD-10-CM

## 2023-12-01 DIAGNOSIS — E46 PROTEIN-CALORIE MALNUTRITION, UNSPECIFIED SEVERITY (MULTI): ICD-10-CM

## 2023-12-01 DIAGNOSIS — E78.00 PURE HYPERCHOLESTEROLEMIA: ICD-10-CM

## 2023-12-01 DIAGNOSIS — R19.7 DIARRHEA, UNSPECIFIED TYPE: ICD-10-CM

## 2023-12-01 DIAGNOSIS — F03.B0 MODERATE DEMENTIA WITHOUT BEHAVIORAL DISTURBANCE, PSYCHOTIC DISTURBANCE, MOOD DISTURBANCE, OR ANXIETY, UNSPECIFIED DEMENTIA TYPE (MULTI): ICD-10-CM

## 2023-12-01 PROBLEM — N39.0 URINARY TRACT INFECTION: Status: RESOLVED | Noted: 2023-05-30 | Resolved: 2023-12-01

## 2023-12-01 LAB
ALBUMIN SERPL BCP-MCNC: 4.6 G/DL (ref 3.4–5)
ALP SERPL-CCNC: 47 U/L (ref 33–136)
ALT SERPL W P-5'-P-CCNC: 12 U/L (ref 7–45)
ANION GAP SERPL CALC-SCNC: 9 MMOL/L (ref 10–20)
AST SERPL W P-5'-P-CCNC: 26 U/L (ref 9–39)
BASOPHILS # BLD AUTO: 0.04 X10*3/UL (ref 0–0.1)
BASOPHILS NFR BLD AUTO: 0.7 %
BILIRUB SERPL-MCNC: 0.6 MG/DL (ref 0–1.2)
BUN SERPL-MCNC: 28 MG/DL (ref 6–23)
CALCIUM SERPL-MCNC: 9.7 MG/DL (ref 8.6–10.3)
CHLORIDE SERPL-SCNC: 102 MMOL/L (ref 98–107)
CHOLEST SERPL-MCNC: 170 MG/DL (ref 0–199)
CHOLESTEROL/HDL RATIO: 2
CO2 SERPL-SCNC: 33 MMOL/L (ref 21–32)
CREAT SERPL-MCNC: 0.93 MG/DL (ref 0.5–1.05)
EOSINOPHIL # BLD AUTO: 0.07 X10*3/UL (ref 0–0.4)
EOSINOPHIL NFR BLD AUTO: 1.2 %
ERYTHROCYTE [DISTWIDTH] IN BLOOD BY AUTOMATED COUNT: 13.3 % (ref 11.5–14.5)
ERYTHROCYTE [SEDIMENTATION RATE] IN BLOOD BY WESTERGREN METHOD: 10 MM/H (ref 0–30)
GFR SERPL CREATININE-BSD FRML MDRD: 60 ML/MIN/1.73M*2
GLUCOSE SERPL-MCNC: 85 MG/DL (ref 74–99)
HCT VFR BLD AUTO: 40.1 % (ref 36–46)
HDLC SERPL-MCNC: 83.9 MG/DL
HGB BLD-MCNC: 12.4 G/DL (ref 12–16)
IMM GRANULOCYTES # BLD AUTO: 0.02 X10*3/UL (ref 0–0.5)
IMM GRANULOCYTES NFR BLD AUTO: 0.3 % (ref 0–0.9)
LDLC SERPL CALC-MCNC: 78 MG/DL
LYMPHOCYTES # BLD AUTO: 1.53 X10*3/UL (ref 0.8–3)
LYMPHOCYTES NFR BLD AUTO: 25.2 %
MCH RBC QN AUTO: 31.2 PG (ref 26–34)
MCHC RBC AUTO-ENTMCNC: 30.9 G/DL (ref 32–36)
MCV RBC AUTO: 101 FL (ref 80–100)
MONOCYTES # BLD AUTO: 0.71 X10*3/UL (ref 0.05–0.8)
MONOCYTES NFR BLD AUTO: 11.7 %
NEUTROPHILS # BLD AUTO: 3.7 X10*3/UL (ref 1.6–5.5)
NEUTROPHILS NFR BLD AUTO: 60.9 %
NON HDL CHOLESTEROL: 86 MG/DL (ref 0–149)
NRBC BLD-RTO: 0 /100 WBCS (ref 0–0)
PLATELET # BLD AUTO: 183 X10*3/UL (ref 150–450)
POTASSIUM SERPL-SCNC: 4.4 MMOL/L (ref 3.5–5.3)
PROT SERPL-MCNC: 7.1 G/DL (ref 6.4–8.2)
RBC # BLD AUTO: 3.97 X10*6/UL (ref 4–5.2)
SODIUM SERPL-SCNC: 140 MMOL/L (ref 136–145)
TRIGL SERPL-MCNC: 41 MG/DL (ref 0–149)
TSH SERPL-ACNC: 2.38 MIU/L (ref 0.44–3.98)
VLDL: 8 MG/DL (ref 0–40)
WBC # BLD AUTO: 6.1 X10*3/UL (ref 4.4–11.3)

## 2023-12-01 PROCEDURE — 99214 OFFICE O/P EST MOD 30 MIN: CPT | Performed by: FAMILY MEDICINE

## 2023-12-01 PROCEDURE — 80061 LIPID PANEL: CPT

## 2023-12-01 PROCEDURE — 85025 COMPLETE CBC W/AUTO DIFF WBC: CPT

## 2023-12-01 PROCEDURE — 84443 ASSAY THYROID STIM HORMONE: CPT

## 2023-12-01 PROCEDURE — 1036F TOBACCO NON-USER: CPT | Performed by: FAMILY MEDICINE

## 2023-12-01 PROCEDURE — 85652 RBC SED RATE AUTOMATED: CPT

## 2023-12-01 PROCEDURE — 1159F MED LIST DOCD IN RCRD: CPT | Performed by: FAMILY MEDICINE

## 2023-12-01 PROCEDURE — 80053 COMPREHEN METABOLIC PANEL: CPT

## 2023-12-01 PROCEDURE — 36415 COLL VENOUS BLD VENIPUNCTURE: CPT

## 2023-12-01 PROCEDURE — 1126F AMNT PAIN NOTED NONE PRSNT: CPT | Performed by: FAMILY MEDICINE

## 2023-12-01 NOTE — PROGRESS NOTES
Subjective   Patient ID: Nedra Quiles is a 88 y.o. female who presents for Anxiety, GERD, and Dementia. I last saw the patient 6/1/2023. Patient's daughter is with her today.     HPI   Patient is having issues with diarrhea. This happens daily in the am. She does drink orange juice every morning. She does have a fiber + protein shake daily, around 11 am. Discuss doubling it. She has noticed some black tarry stools as well. Her appetite is still good with no swallowing issues. She rarely has solid BMs.     The have noticed that her right knee is turning in more as she walks. She does not feel unsteady. She uses a walker at home.     She will get a RSV vaccine from pharmacy.     Review of Systems  Except positives as noted in the CC & HPI      Constitutional: Denies fevers, chills, night sweats, fatigue, weight changes, change in appetite    Eyes: Denies blurry vision, double vision    ENT: Denies otalgia, trouble hearing, tinnitus, vertigo, nasal congestion, rhinorrhea, sore throat    Neck: Denies swelling, masses    Cardiovascular: Denies chest pain, palpitations, edema, orthopnea, syncope    Respiratory: Denies dyspnea, cough, wheezing, postural nocturnal dyspnea    Gastrointestinal: Denies abdominal pain, nausea, vomiting, constipation, melena, hematochezia    Genitourinary: Denies dysuria, hematuria, frequency, urgency    Musculoskeletal: Denies back pain, neck pain, myalgias    Integumentary: Denies skin lesions, rashes, masses    Neurological: Denies dizziness, headaches, confusion, limb weakness, paresthesias, syncope, convulsions    Psychiatric: Denies depression, anxiety, homicidal ideations, suicidal ideations, sleep disturbances    Endocrine: Denies polyphagia, polydipsia, polyuria, weakness, hair thinning, heat intolerance, cold intolerance, weight changes    Heme/Lymph: Denies easy bruising, easy bleeding, swollen glands    Objective   /52 (BP Location: Right arm, Patient Position: Sitting)    "Pulse 57   Temp 36.6 °C (97.8 °F) (Temporal)   Ht 1.575 m (5' 2\")   Wt 45.5 kg (100 lb 3.2 oz)   SpO2 97%   BMI 18.33 kg/m²     Physical Exam  124/52 on recheck of BP in the right arm.     Gen. Appearance - well-developed, well-nourished, 88 y.o., White female in no acute distress. Patient is mildly unsteady on her feet. She does walk with a Hurri-cane.     Skin - warm, pink and dry without rash or concerning lesions.    Mental Status - alert and oriented times 3. Normal mood and affect appropriate to mood.     Neck - supple without lymphadenopathy. Carotid pulses are normal without bruits. Thyroid is normal in midline without nodules.    Chest - lungs are clear to auscultation without rales, rhonchi or wheezes.    Heart - regular, rate, and rhythm without murmurs, rubs or gallops. Grade 1/6 systolic ejection murmur heard best at right sternal border, second intercostal space.     Abdomen - soft, scaphoid, nontender, nondistended. No masses, hepatomegaly or splenomegaly is noted. No rebound, rigidity or guarding is noted. Bowel sounds are normoactive.     Knee - Normal visual inspection, no erythema, warmth, or effusion. Normal knee alignment. Range of motion to flexion to 150° and extension to 0° of right knee. Range of motion to flexion to 170° and extension to 0° of left knee. Negative Lachman, anterior drawer test, valgus and varus stress testing. Shankar's testing, patellar apprehension. No tenderness to palpation of MCL, LCL, medial joint line, lateral joint line, patellar tendon, quadriceps tendon, or pes bursa. Sensation intact. Muscle strength +5/5. Gait normal. Normal range of motion of hips. Varus deformity of the right knee.     Extremities - no cyanosis, clubbing or edema. Pedal pulses are 2+ normal at the dorsalis pedis and posterior tibial pulses bilaterally.     Neurological - cranial nerves II through XII are grossly intact. Motor strength 5/5 at all fours.     Assessment/Plan   1. Diarrhea, " unspecified type  Referral to Gastroenterology    TSH with reflex to Free T4 if abnormal    Sedimentation Rate      2. Moderate dementia without behavioral disturbance, psychotic disturbance, mood disturbance, or anxiety, unspecified dementia type (CMS/HCC)  Follow Up In [facility] - PCP    Follow Up In [facility] - PCP - Established      3. Parkinson's disease, unspecified whether dyskinesia present, unspecified whether manifestations fluctuate  Follow Up In [facility] - PCP    Follow Up In [facility] - PCP - Established      4. Generalized anxiety disorder  Follow Up In [facility] - PCP    Follow Up In [facility]  PCP - Established      5. Pure hypercholesterolemia  Lipid Panel      6. Gastroesophageal reflux disease without esophagitis        7. Chronic kidney disease, stage 3a (CMS/HCC)  Comprehensive Metabolic Panel    Follow Up In [facility] - PCP - Established      8. Body mass index (BMI) less than 19  CBC and Auto Differential    TSH with reflex to Free T4 if abnormal      9. Protein-calorie malnutrition, unspecified severity (CMS/HCC)        Patient to continue current medications (with any exceptions as noted) and diet. Follow-up in 6 month(s) otherwise as needed.      Patient is to complete fasting CBC, CMP, lipid panel, TSH, sed rate labs today. Will call patient with results when available.     Refer patient to Dr. Rain for further evaluation of diarrhea, colonoscopy, and EGD.     Recommend patient try straight leg raises while sitting to help strengthen the muscles in her knee.     She plans to receive her Shingrix and RSV vaccines at the pharmacy.     Patient is to follow up with Dia Solis (urology CNP) as scheduled.       Scribe Attestation  By signing my name below, IAshley Scribe   attest that this documentation has been prepared under the direction and in the presence of Botson Peters MD.

## 2023-12-01 NOTE — PATIENT INSTRUCTIONS
Patient to continue current medications (with any exceptions as noted) and diet. Follow-up in 6 month(s) otherwise as needed.      Patient is to complete fasting CBC, CMP, lipid panel, TSH, sed rate labs today. Will call patient with results when available.     Refer patient to Dr. Rain for further evaluation of diarrhea, colonoscopy, and EGD.     Recommend patient try straight leg raises while sitting to help strengthen the muscles in her knee.     She plans to receive her Shingrix and RSV vaccines at the pharmacy.     Patient is to follow up with Dia Solis (urology CNP) as scheduled.

## 2023-12-15 DIAGNOSIS — G20.A1 PARKINSON'S DISEASE, UNSPECIFIED WHETHER DYSKINESIA PRESENT, UNSPECIFIED WHETHER MANIFESTATIONS FLUCTUATE (MULTI): ICD-10-CM

## 2023-12-15 RX ORDER — CARBIDOPA AND LEVODOPA 25; 100 MG/1; MG/1
1 TABLET ORAL 3 TIMES DAILY
Qty: 270 TABLET | Refills: 0 | Status: SHIPPED | OUTPATIENT
Start: 2023-12-15 | End: 2024-03-14

## 2023-12-26 DIAGNOSIS — N39.41 URGE INCONTINENCE OF URINE: Primary | ICD-10-CM

## 2023-12-26 DIAGNOSIS — N32.89 BLADDER INSTABILITY: Primary | ICD-10-CM

## 2023-12-26 RX ORDER — VIBEGRON 75 MG/1
1 TABLET, FILM COATED ORAL DAILY
Qty: 90 TABLET | Refills: 3 | Status: SHIPPED | OUTPATIENT
Start: 2023-12-26

## 2023-12-30 RX ORDER — TROSPIUM CHLORIDE ER 60 MG/1
CAPSULE ORAL
Qty: 90 CAPSULE | Refills: 3 | Status: SHIPPED | OUTPATIENT
Start: 2023-12-30

## 2024-01-01 DIAGNOSIS — F03.B0 MODERATE DEMENTIA WITHOUT BEHAVIORAL DISTURBANCE, PSYCHOTIC DISTURBANCE, MOOD DISTURBANCE, OR ANXIETY, UNSPECIFIED DEMENTIA TYPE (MULTI): ICD-10-CM

## 2024-01-02 RX ORDER — DONEPEZIL HYDROCHLORIDE 10 MG/1
10 TABLET, FILM COATED ORAL DAILY
Qty: 90 TABLET | Refills: 2 | Status: SHIPPED | OUTPATIENT
Start: 2024-01-02 | End: 2024-04-25 | Stop reason: SDUPTHER

## 2024-01-29 ENCOUNTER — TELEPHONE (OUTPATIENT)
Dept: PRIMARY CARE | Facility: CLINIC | Age: 89
End: 2024-01-29
Payer: MEDICARE

## 2024-01-29 NOTE — TELEPHONE ENCOUNTER
JORGE FROM [facility] CALLED JUST AN FYI FOR DR MAN THEY ARE GOING TO OPEN PATIENT UP FOR HOME CARE TOMORROW 1/30/2024

## 2024-02-09 DIAGNOSIS — N39.0 RECURRENT UTI: Primary | ICD-10-CM

## 2024-02-12 RX ORDER — SULFAMETHOXAZOLE AND TRIMETHOPRIM 800; 160 MG/1; MG/1
TABLET ORAL
Qty: 30 TABLET | Refills: 3 | Status: SHIPPED | OUTPATIENT
Start: 2024-02-12

## 2024-03-14 ENCOUNTER — APPOINTMENT (OUTPATIENT)
Dept: NEUROLOGY | Facility: HOSPITAL | Age: 89
End: 2024-03-14
Payer: MEDICARE

## 2024-03-14 DIAGNOSIS — G20.A1 PARKINSON'S DISEASE, UNSPECIFIED WHETHER DYSKINESIA PRESENT, UNSPECIFIED WHETHER MANIFESTATIONS FLUCTUATE (MULTI): ICD-10-CM

## 2024-03-14 RX ORDER — CARBIDOPA AND LEVODOPA 25; 100 MG/1; MG/1
1 TABLET ORAL 3 TIMES DAILY
Qty: 270 TABLET | Refills: 0 | Status: SHIPPED | OUTPATIENT
Start: 2024-03-14 | End: 2024-04-25 | Stop reason: SDUPTHER

## 2024-04-25 ENCOUNTER — OFFICE VISIT (OUTPATIENT)
Dept: NEUROLOGY | Facility: HOSPITAL | Age: 89
End: 2024-04-25
Payer: MEDICARE

## 2024-04-25 VITALS
DIASTOLIC BLOOD PRESSURE: 71 MMHG | TEMPERATURE: 96.3 F | HEART RATE: 60 BPM | HEIGHT: 61 IN | SYSTOLIC BLOOD PRESSURE: 152 MMHG | RESPIRATION RATE: 18 BRPM | WEIGHT: 97 LBS | BODY MASS INDEX: 18.31 KG/M2

## 2024-04-25 DIAGNOSIS — F03.B0 MODERATE DEMENTIA WITHOUT BEHAVIORAL DISTURBANCE, PSYCHOTIC DISTURBANCE, MOOD DISTURBANCE, OR ANXIETY, UNSPECIFIED DEMENTIA TYPE (MULTI): ICD-10-CM

## 2024-04-25 DIAGNOSIS — G20.A1 PARKINSON'S DISEASE, UNSPECIFIED WHETHER DYSKINESIA PRESENT, UNSPECIFIED WHETHER MANIFESTATIONS FLUCTUATE (MULTI): ICD-10-CM

## 2024-04-25 PROCEDURE — 1036F TOBACCO NON-USER: CPT | Performed by: PSYCHIATRY & NEUROLOGY

## 2024-04-25 PROCEDURE — 1157F ADVNC CARE PLAN IN RCRD: CPT | Performed by: PSYCHIATRY & NEUROLOGY

## 2024-04-25 PROCEDURE — 1126F AMNT PAIN NOTED NONE PRSNT: CPT | Performed by: PSYCHIATRY & NEUROLOGY

## 2024-04-25 PROCEDURE — 1159F MED LIST DOCD IN RCRD: CPT | Performed by: PSYCHIATRY & NEUROLOGY

## 2024-04-25 PROCEDURE — 1160F RVW MEDS BY RX/DR IN RCRD: CPT | Performed by: PSYCHIATRY & NEUROLOGY

## 2024-04-25 PROCEDURE — 99214 OFFICE O/P EST MOD 30 MIN: CPT | Performed by: PSYCHIATRY & NEUROLOGY

## 2024-04-25 RX ORDER — DONEPEZIL HYDROCHLORIDE 10 MG/1
10 TABLET, FILM COATED ORAL DAILY
Qty: 90 TABLET | Refills: 2 | Status: SHIPPED | OUTPATIENT
Start: 2024-04-25 | End: 2025-04-25

## 2024-04-25 RX ORDER — CARBIDOPA AND LEVODOPA 25; 100 MG/1; MG/1
1 TABLET ORAL 3 TIMES DAILY
Qty: 270 TABLET | Refills: 3 | Status: SHIPPED | OUTPATIENT
Start: 2024-04-25 | End: 2025-04-25

## 2024-04-25 ASSESSMENT — UNIFIED PARKINSONS DISEASE RATING SCALE (UPDRS)
AMPLITUDE_RUE: 1
RIGIDITY_LLE: 1
AMPLITUDE_LLE: 0
AMPLITUDE_LUE: 1
POSTURAL_TREMOR_RIGHTHAND: 0
FINGER_TAPPING_RIGHT: 1
AMPLITUDE_LIP_JAW: 0
TOTAL_SCORE: 33
PRONATION_SUPINATION_LEFT: 0
FACIAL_EXPRESSION: 1
TOETAPPING_RIGHT: 0
LEVODOPA: YES
HANDMOVEMENTS_RIGHT: 1
CHAIR_RISING_SCALE: 2
RIGIDITY_RLE: 1
PRONATION_SUPINATION_RIGHT: 0
RIGIDITY_NECK: 1
CONSTANCY_TREMOR_ATREST: 1
FREEZING_GAIT: 0
SPONTANEITY_OF_MOVEMENT: 1
CLINICAL_STATE: ON
POSTURAL_STABILITY: 2
RIGIDITY_LUE: 1
TOETAPPING_LEFT: 0
FINGER_TAPPING_LEFT: 1
LEG_AGILITY_LEFT: 1
SPEECH: 2
RIGIDITY_RUE: 1
KINETIC_TREMOR_LEFTHAND: 2
GAIT: 3
DYSKINESIAS_PRESENT: NO
AMPLITUDE_RLE: 0
PARKINSONS_MEDS: YES
POSTURE: 3
LEG_AGILITY_RIGHT: 1
KINETIC_TREMOR_RIGHTHAND: 1
POSTURAL_TREMOR_LEFTHAND: 1

## 2024-04-25 ASSESSMENT — MONTREAL COGNITIVE ASSESSMENT (MOCA)
WHAT IS THE TOTAL SCORE (OUT OF 30): 20
4. NAME EACH OF THE THREE ANIMALS SHOWN: 3
VISUOSPATIAL/EXECUTIVE SUBSCORE: 2
7. [VIGILENCE] TAP WHEN HEARING DESIGNATED LETTER: 1
5. MEMORY TRIALS: 0
12. MEMORY INDEX SCORE: 0
6. READ LIST OF DIGITS [FORWARD/BACKWARD]: 2
8. SERIAL SUBTRACTION OF 7S: 3
9. REPEAT EACH SENTENCE: 1
11. FOR EACH PAIR OF WORDS, WHAT CATEGORY DO THEY BELONG TO (OUT OF 2): 2
10. [FLUENCY] NAME WORDS STARTING WITH DESIGNATED LETTER: 0
13. ORIENTATION SUBSCORE: 6
WHAT LEVEL OF EDUCATION WAS ATTAINED: 0

## 2024-04-25 ASSESSMENT — PAIN SCALES - GENERAL: PAINLEVEL: 0-NO PAIN

## 2024-04-25 NOTE — PATIENT INSTRUCTIONS
It was nice to see you today!  Continue carbidopa/levodopa 25/100 - one tab three times a day  Continue Donepezil 10 mg   Kepe taking multivitamins.   Your memory test today was 20/30.   Yearly check of b12, folic acid and tsh with pcp.   RTC in 12 months.

## 2024-04-25 NOTE — LETTER
April 25, 2024     Boston Peters MD  [address] [facility] [address]  [facility] [address]    Patient: Nedra Quiles   YOB: 1935   Date of Visit: 4/25/2024       Dear Dr. Boston Peters MD:    Thank you for referring Nedra Quiles to me for evaluation. Below are my notes for this consultation.  If you have questions, please do not hesitate to call me. I look forward to following your patient along with you.       Sincerely,     Ellen Marinelli MD      CC: No Recipients  ______________________________________________________________________________________    Subjective    Nedra Quiles is a right handed  88 y.o. year old female who presents with No chief complaint on file.. Patient is accompanied by: child daughter  Visit type: follow up visit   88 year old female with parkinsonism and cognitive impairment.     Last seen a year back by Gavin MCGREGOR, no changes made at that time.   She feels that she is doing okay and has no specific concerns.     Relevant meds:  Carbidopa/levodopa 25/100 one tab tid  Donepezil 10 mg daily  No motor fluctuations, no nausea, no VH.     Review of Motor symptoms:  Tremor:  Location- arms shake, R>L                 Rest/postural/action- w action.   Stiffness/rigidity: denies  Slowness:  yes, slowing down a little  Trouble walking:  walking w  a walker. She does shuffle.   Freezing of gait:  denies  Balance problems:  yes  Falls:  no recent falls,   Changes in speech:  soft speech, sometimes can get stuck w words, takes her a minute to form the words then it is ok.   Swallowing difficulties:  denies  Activities of daily living (buttoning clothes, bathing, cutting food, etc):  denies. Lives alone. Lives in a senior apartment, has a group of seniors that she hangs with, and her kids watch out. She does her own grocery shopping    Review of Non-Motor symptoms:  Cognition:  Memory- fine for most part.          Hallucinations-  denies         Mood:        Depression-  denies                        Anxiety: denies  Sleep disturbances including REM behavior disorder: sleeps well. No RBD  Sensory changes (ie, smell or taste):  denies  Gastrointestinal complaints/Constipation:  denies, has diarrhea,   Urinary retention or frequency:  urinary issues controlled w meds  Positional lightheadedness and/or syncope:  denies  Excessive saliva/drooling:  denies      MoCA score review:  2016: 27/30, 23/30 in 2019, 24/30 in 2021.          Patient Active Problem List   Diagnosis   • Abnormality of gait and mobility   • Anxiety disorder   • Bladder instability   • Chronic kidney disease, stage 3a (Multi)   • Cognitive decline   • Dementia (Multi)   • Contusion of back, initial encounter   • Gastroesophageal reflux disease without esophagitis   • Hordeolum externum of right lower eyelid   • Macrocytic anemia   • Nocturia   • Parkinsonism (Multi)   • Pure hypercholesterolemia   • Renal cysts, acquired, bilateral   • Syncope   • Urge incontinence of urine   • Urgency of urination   • Dysuria   • Urinary incontinence   • Urinary retention   • Body mass index (BMI) less than 19   • Protein-calorie malnutrition, unspecified severity (Multi)      Past Medical History:   Diagnosis Date   • Residual hemorrhoidal skin tags     External hemorrhoids      Past Surgical History:   Procedure Laterality Date   • BLADDER SURGERY  09/19/2016    Bladder Surgery   • CHOLECYSTECTOMY  09/03/2014    Cholecystectomy Laparoscopic      Social History     Socioeconomic History   • Marital status:      Spouse name: Not on file   • Number of children: Not on file   • Years of education: Not on file   • Highest education level: Not on file   Occupational History   • Not on file   Tobacco Use   • Smoking status: Never     Passive exposure: Never   • Smokeless tobacco: Never   Substance and Sexual Activity   • Alcohol use: Never   • Drug use: Never   • Sexual activity: Not on file   Other Topics Concern   • Not on file   Social History  Narrative   • Not on file     Social Determinants of Health     Financial Resource Strain: Not on file   Food Insecurity: Not on file   Transportation Needs: Not on file   Physical Activity: Not on file   Stress: Not on file   Social Connections: Not on file   Intimate Partner Violence: Not on file   Housing Stability: Not on file      Family History   Problem Relation Name Age of Onset   • Tuberculosis Mother     • Hypertension Father                   Review of Systems  All other system have been reviewed and are negative for complaint.  Objective  Vitals:    24 1055   BP: 152/71   Pulse: 60   Resp: 18   Temp: 35.7 °C (96.3 °F)      Neurological Exam      MDS UPDRS 1st Score: Motor Examination  Is the patient on medication for treating the symptoms of Parkinson's Disease?: Yes  Patients receiving medication for treating the symptoms of Parkinson's Disease, shante the patient's clinical state.: On  Is the patient on Levodopa?: Yes  Speech: 2  Facial Expression: 1  Rigidty Neck: 1  Rigidty RUE: 1  Rigidity - LUE: 1  Rigidity RLE: 1  Rigidity LLE: 1  Finger Tapping Right Hand: 1  Finger Tapping Left Hand: 1  Hand Movements- Right Hand: 1  Hand Movements- Left Hand: 2  Pronatiaon-Supination Movments - Right Hand: 0  Pronatiaon-Supination Movments Left Hand: 0  Toe Tapping Right Foot: 0  Toe Tapping - Left Foot: 0  Leg Agility - Right Le  Leg Agility - Left le  Arising from Chair: 2  Gait: 3  Freezing of Gait: 0  Postural Stability: 2  Posture: 3  Global Spontanteity of Movment ( Body Bradykinesia): 1  Postural Tremor - Right Hand: 0  Postural Tremor - Left hand: 1  Kinetic Tremor - Right hand: 1  Kinetic Tremor - Left hand: 2  Rest Tremor Amplitude - RUE: 1  Rest Tremor Amplitude - LUE: 1  Rest Tremor Amplitude - RLE: 0  Rest Tremor Amplitude - LLE: 0  Rest Tremor Amplitude - Lip/Jaw: 0  Constancy of Rest Tremor: 1  MDS UPDRS Total Score: 33  Were dyskinesias (chorea or dystonia) present during  examination?: No      Rolando Cognitive Assessment:  Abstraction: 2, Attention: Read List of Digits: 2, Attention: Read List of Letters: 1, Attention: Serial Sevens: 3, Delayed Recall: 0, Language: Fluency: 0, Memory (Score '0' as this is an Unscored Section): 0, Language: Repeat: 1, Naming: 3, Orientation: 6, Visuospatial/Executive: 2, MOCA Total Score: 20         Thyroid Stimulating Hormone   Date Value Ref Range Status   12/01/2023 2.38 0.44 - 3.98 mIU/L Final     Folate   Date Value Ref Range Status   04/15/2020 20.8 >5.0 ng/mL Final     Comment:     Low           <3.4  Borderline 3.4-5.0  Normal        >5.0  .   Biotin interference may cause falsely elevated results.    Patients taking a Biotin dose of up to 5 mg/day should    refrain from taking Biotin for 24 hours before sample    collection. Providers may contact their local laboratory   for further information.             Assessment/Plan      Nedra Quiles is a 88 y.o. year old female here for follow up of parkinsonism and cognitive impairment.   Her MoCA was 27/30 in 2016 and was 24/30 in 2021, it was 20/30 today.   Her motor symptoms appear stable.   Plan:    Continue carbidopa/levodopa 25/100 - one tab three times a day  Continue Donepezil 10 mg   Kepe taking multivitamins.   Yearly check of b12, folic acid and tsh with pcp.   RTC in 12 months.

## 2024-04-25 NOTE — PROGRESS NOTES
Subjective     Nedra Quiles is a right handed  88 y.o. year old female who presents with No chief complaint on file.. Patient is accompanied by: child daughter  Visit type: follow up visit   88 year old female with parkinsonism and cognitive impairment.     Last seen a year back by Gavin MCGREGOR, no changes made at that time.   She feels that she is doing okay and has no specific concerns.     Relevant meds:  Carbidopa/levodopa 25/100 one tab tid  Donepezil 10 mg daily  No motor fluctuations, no nausea, no VH.     Review of Motor symptoms:  Tremor:  Location- arms shake, R>L                 Rest/postural/action- w action.   Stiffness/rigidity: denies  Slowness:  yes, slowing down a little  Trouble walking:  walking w  a walker. She does shuffle.   Freezing of gait:  denies  Balance problems:  yes  Falls:  no recent falls,   Changes in speech:  soft speech, sometimes can get stuck w words, takes her a minute to form the words then it is ok.   Swallowing difficulties:  denies  Activities of daily living (buttoning clothes, bathing, cutting food, etc):  denies. Lives alone. Lives in a senior apartment, has a group of seniors that she hangs with, and her kids watch out. She does her own grocery shopping    Review of Non-Motor symptoms:  Cognition:  Memory- fine for most part.          Hallucinations-  denies         Mood:        Depression-  denies                       Anxiety: denies  Sleep disturbances including REM behavior disorder: sleeps well. No RBD  Sensory changes (ie, smell or taste):  denies  Gastrointestinal complaints/Constipation:  denies, has diarrhea,   Urinary retention or frequency:  urinary issues controlled w meds  Positional lightheadedness and/or syncope:  denies  Excessive saliva/drooling:  denies      MoCA score review:  2016: 27/30, 23/30 in 2019, 24/30 in 2021.          Patient Active Problem List   Diagnosis    Abnormality of gait and mobility    Anxiety disorder    Bladder instability     Chronic kidney disease, stage 3a (Multi)    Cognitive decline    Dementia (Multi)    Contusion of back, initial encounter    Gastroesophageal reflux disease without esophagitis    Hordeolum externum of right lower eyelid    Macrocytic anemia    Nocturia    Parkinsonism (Multi)    Pure hypercholesterolemia    Renal cysts, acquired, bilateral    Syncope    Urge incontinence of urine    Urgency of urination    Dysuria    Urinary incontinence    Urinary retention    Body mass index (BMI) less than 19    Protein-calorie malnutrition, unspecified severity (Multi)      Past Medical History:   Diagnosis Date    Residual hemorrhoidal skin tags     External hemorrhoids      Past Surgical History:   Procedure Laterality Date    BLADDER SURGERY  09/19/2016    Bladder Surgery    CHOLECYSTECTOMY  09/03/2014    Cholecystectomy Laparoscopic      Social History     Socioeconomic History    Marital status:      Spouse name: Not on file    Number of children: Not on file    Years of education: Not on file    Highest education level: Not on file   Occupational History    Not on file   Tobacco Use    Smoking status: Never     Passive exposure: Never    Smokeless tobacco: Never   Substance and Sexual Activity    Alcohol use: Never    Drug use: Never    Sexual activity: Not on file   Other Topics Concern    Not on file   Social History Narrative    Not on file     Social Determinants of Health     Financial Resource Strain: Not on file   Food Insecurity: Not on file   Transportation Needs: Not on file   Physical Activity: Not on file   Stress: Not on file   Social Connections: Not on file   Intimate Partner Violence: Not on file   Housing Stability: Not on file      Family History   Problem Relation Name Age of Onset    Tuberculosis Mother      Hypertension Father                   Review of Systems  All other system have been reviewed and are negative for complaint.  Objective   Vitals:    04/25/24 1055   BP: 152/71   Pulse: 60    Resp: 18   Temp: 35.7 °C (96.3 °F)      Neurological Exam      MDS UPDRS 1st Score: Motor Examination  Is the patient on medication for treating the symptoms of Parkinson's Disease?: Yes  Patients receiving medication for treating the symptoms of Parkinson's Disease, shante the patient's clinical state.: On  Is the patient on Levodopa?: Yes  Speech: 2  Facial Expression: 1  Rigidty Neck: 1  Rigidty RUE: 1  Rigidity - LUE: 1  Rigidity RLE: 1  Rigidity LLE: 1  Finger Tapping Right Hand: 1  Finger Tapping Left Hand: 1  Hand Movements- Right Hand: 1  Hand Movements- Left Hand: 2  Pronatiaon-Supination Movments - Right Hand: 0  Pronatiaon-Supination Movments Left Hand: 0  Toe Tapping Right Foot: 0  Toe Tapping - Left Foot: 0  Leg Agility - Right Le  Leg Agility - Left le  Arising from Chair: 2  Gait: 3  Freezing of Gait: 0  Postural Stability: 2  Posture: 3  Global Spontanteity of Movment ( Body Bradykinesia): 1  Postural Tremor - Right Hand: 0  Postural Tremor - Left hand: 1  Kinetic Tremor - Right hand: 1  Kinetic Tremor - Left hand: 2  Rest Tremor Amplitude - RUE: 1  Rest Tremor Amplitude - LUE: 1  Rest Tremor Amplitude - RLE: 0  Rest Tremor Amplitude - LLE: 0  Rest Tremor Amplitude - Lip/Jaw: 0  Constancy of Rest Tremor: 1  MDS UPDRS Total Score: 33  Were dyskinesias (chorea or dystonia) present during examination?: No      Rolando Cognitive Assessment:  Abstraction: 2, Attention: Read List of Digits: 2, Attention: Read List of Letters: 1, Attention: Serial Sevens: 3, Delayed Recall: 0, Language: Fluency: 0, Memory (Score '0' as this is an Unscored Section): 0, Language: Repeat: 1, Naming: 3, Orientation: 6, Visuospatial/Executive: 2, MOCA Total Score: 20         Thyroid Stimulating Hormone   Date Value Ref Range Status   2023 2.38 0.44 - 3.98 mIU/L Final     Folate   Date Value Ref Range Status   04/15/2020 20.8 >5.0 ng/mL Final     Comment:     Low           <3.4  Borderline 3.4-5.0  Normal         >5.0  .   Biotin interference may cause falsely elevated results.    Patients taking a Biotin dose of up to 5 mg/day should    refrain from taking Biotin for 24 hours before sample    collection. Providers may contact their local laboratory   for further information.             Assessment/Plan       Nedra Quiles is a 88 y.o. year old female here for follow up of parkinsonism and cognitive impairment.   Her MoCA was 27/30 in 2016 and was 24/30 in 2021, it was 20/30 today.   Her motor symptoms appear stable.   Plan:    Continue carbidopa/levodopa 25/100 - one tab three times a day  Continue Donepezil 10 mg   Kepe taking multivitamins.   Yearly check of b12, folic acid and tsh with pcp.   RTC in 12 months.

## 2024-06-03 ENCOUNTER — OFFICE VISIT (OUTPATIENT)
Dept: PRIMARY CARE | Facility: CLINIC | Age: 89
End: 2024-06-03
Payer: MEDICARE

## 2024-06-03 VITALS
DIASTOLIC BLOOD PRESSURE: 52 MMHG | BODY MASS INDEX: 17.94 KG/M2 | RESPIRATION RATE: 16 BRPM | OXYGEN SATURATION: 93 % | HEART RATE: 62 BPM | SYSTOLIC BLOOD PRESSURE: 114 MMHG | WEIGHT: 95 LBS | TEMPERATURE: 97.2 F | HEIGHT: 61 IN

## 2024-06-03 DIAGNOSIS — G20.A1 PARKINSON'S DISEASE, UNSPECIFIED WHETHER DYSKINESIA PRESENT, UNSPECIFIED WHETHER MANIFESTATIONS FLUCTUATE (MULTI): ICD-10-CM

## 2024-06-03 DIAGNOSIS — E46 PROTEIN-CALORIE MALNUTRITION, UNSPECIFIED SEVERITY (MULTI): ICD-10-CM

## 2024-06-03 DIAGNOSIS — F41.1 GENERALIZED ANXIETY DISORDER: ICD-10-CM

## 2024-06-03 DIAGNOSIS — R32 URINARY INCONTINENCE, UNSPECIFIED TYPE: ICD-10-CM

## 2024-06-03 DIAGNOSIS — Z00.00 ROUTINE GENERAL MEDICAL EXAMINATION AT HEALTH CARE FACILITY: Primary | ICD-10-CM

## 2024-06-03 DIAGNOSIS — I48.0 PAROXYSMAL ATRIAL FIBRILLATION (MULTI): ICD-10-CM

## 2024-06-03 DIAGNOSIS — N18.31 CHRONIC KIDNEY DISEASE, STAGE 3A (MULTI): ICD-10-CM

## 2024-06-03 DIAGNOSIS — I73.89 OTHER SPECIFIED PERIPHERAL VASCULAR DISEASES (CMS-HCC): ICD-10-CM

## 2024-06-03 DIAGNOSIS — F03.B0 MODERATE DEMENTIA WITHOUT BEHAVIORAL DISTURBANCE, PSYCHOTIC DISTURBANCE, MOOD DISTURBANCE, OR ANXIETY, UNSPECIFIED DEMENTIA TYPE (MULTI): ICD-10-CM

## 2024-06-03 DIAGNOSIS — E78.00 PURE HYPERCHOLESTEROLEMIA: ICD-10-CM

## 2024-06-03 PROCEDURE — 1157F ADVNC CARE PLAN IN RCRD: CPT | Performed by: FAMILY MEDICINE

## 2024-06-03 PROCEDURE — 99214 OFFICE O/P EST MOD 30 MIN: CPT | Performed by: FAMILY MEDICINE

## 2024-06-03 PROCEDURE — 1170F FXNL STATUS ASSESSED: CPT | Performed by: FAMILY MEDICINE

## 2024-06-03 PROCEDURE — G0439 PPPS, SUBSEQ VISIT: HCPCS | Performed by: FAMILY MEDICINE

## 2024-06-03 PROCEDURE — 1036F TOBACCO NON-USER: CPT | Performed by: FAMILY MEDICINE

## 2024-06-03 RX ORDER — APIXABAN 2.5 MG/1
2.5 TABLET, FILM COATED ORAL 2 TIMES DAILY
COMMUNITY

## 2024-06-03 RX ORDER — METOPROLOL TARTRATE 25 MG/1
25 TABLET, FILM COATED ORAL 2 TIMES DAILY
COMMUNITY
Start: 2024-02-08

## 2024-06-03 ASSESSMENT — ACTIVITIES OF DAILY LIVING (ADL)
DOING_HOUSEWORK: INDEPENDENT
DRESSING: INDEPENDENT
MANAGING_FINANCES: INDEPENDENT
BATHING: INDEPENDENT
TAKING_MEDICATION: INDEPENDENT
GROCERY_SHOPPING: INDEPENDENT

## 2024-06-03 ASSESSMENT — PATIENT HEALTH QUESTIONNAIRE - PHQ9
SUM OF ALL RESPONSES TO PHQ9 QUESTIONS 1 AND 2: 0
2. FEELING DOWN, DEPRESSED OR HOPELESS: NOT AT ALL
1. LITTLE INTEREST OR PLEASURE IN DOING THINGS: NOT AT ALL

## 2024-06-03 ASSESSMENT — ENCOUNTER SYMPTOMS
OCCASIONAL FEELINGS OF UNSTEADINESS: 1
DEPRESSION: 0
LOSS OF SENSATION IN FEET: 0

## 2024-06-03 NOTE — PROGRESS NOTES
"Chief Complaint   Patient presents with    Follow-up      Moderate dementia without behavioral disturbance, psychotic disturbance, mood disturbance, or anxiety, unspecified dementia type (Multi); Parkinson's disease, unspecified whether dyskinesia present, unspecified whether manifestations fluctuate (Multi); Generalized anxiety disorder    Medicare Annual Wellness Visit Subsequent       HPI: Nedra Quiles is a 88 y.o. female presenting for follow-up of Follow-up ( Moderate dementia without behavioral disturbance, psychotic disturbance, mood disturbance, or anxiety, unspecified dementia type (Multi); Parkinson's disease, unspecified whether dyskinesia present, unspecified whether manifestations fluctuate (Multi); Generalized anxiety disorder) and Medicare Annual Wellness Visit Subsequent  . I last saw the patient 12/1/2023.       Patient is here for follow up,  Eating well and exercising as tolerated  Taking medication as directed  Has no medical concerns for rooming MA.    Patient was started on Eliquis and metoprolol by her cardiologist. She has a \"heart murmur\".  She also has a history of atrial fibrillation according to her daughter.      ROS    Except positives as noted in the CC & HPI   Constitutional: Denies fevers, chills, night sweats, fatigue, weight changes, change in appetite   Eyes: Denies blurry vision, double vision   ENT: Denies otalgia, trouble hearing, tinnitus, vertigo, nasal congestion, rhinorrhea, sore throat   Neck: Denies swelling, masses   Cardiovascular: Denies chest pain, palpitations, edema, orthopnea, syncope   Respiratory: Denies dyspnea, cough, wheezing, postural nocturnal dyspnea   Gastrointestinal: Denies abdominal pain, nausea, vomiting, diarrhea, constipation, melena, hematochezia   Genitourinary: Denies dysuria, hematuria, frequency, urgency   Musculoskeletal: Denies back pain, neck pain, arthralgias, myalgias   Integumentary: Denies skin lesions, rashes, masses   Neurological: " "Denies dizziness, headaches, confusion, limb weakness, paresthesias, syncope, convulsions   Psychiatric: Denies depression, anxiety, homicidal ideations, suicidal ideations, sleep disturbances   Endocrine: Denies polyphagia, polydipsia, polyuria, weakness, hair thinning, heat intolerance, cold intolerance, weight changes   Heme/Lymph: Denies easy bruising, easy bleeding, swollen glands     Vitals:    06/03/24 0809 06/03/24 0829   BP: 110/62 114/52   BP Location: Left arm    Pulse: 62    Resp: 16    Temp: 36.2 °C (97.2 °F)    SpO2: 93%    Weight: (!) 43.1 kg (95 lb)    Height: 1.549 m (5' 1\")        PHYSICAL EXAM    GENERAL APPEARANCE: well-developed, well-nourished, 88 y.o. female in no acute distress.  SKIN: warm, pink and dry without rash or concerning lesions.  MENTAL STATUS: alert and oriented × 3. Normal mood and affect appropriate to mood.  NECK: supple without lymphadenopathy. Carotid pulses are normal without bruits. Thyroid - is normal in midline without nodules.  CHEST: lungs are clear to auscultation without rales, rhonchi or wheezes.  HEART: regular, rate and rhythm without murmurs, rubs or gallops.  ABDOMEN: soft, flat, nondistended. No masses, hepatomegaly or splenomegaly is noted.  EXTREMITIES: no cyanosis, clubbing or edema. Pedal pulses are 2+ normal at the dorsalis pedis and posterior tibial pulses bilaterally.  NEUROLOGICAL: cranial nerves II through XII are grossly intact. Motor strength 5/5 at all fours.     Below is the patient's most recent value for Albumin, ALT, AST, BUN, Calcium, Chloride, Cholesterol, CO2, Creatinine, GFR, Glucose, HDL, Hematocrit, Hemoglobin, Hemoglobin A1C, LDL, Magnesium, Phosphorus, Platelets, Potassium, PSA, Sodium, Triglycerides, and WBC.   Lab Results   Component Value Date    ALBUMIN 4.6 12/01/2023    ALT 12 12/01/2023    AST 26 12/01/2023    BUN 28 (H) 12/01/2023    CALCIUM 9.7 12/01/2023     12/01/2023    CHOL 170 12/01/2023    CO2 33 (H) 12/01/2023    " CREATININE 0.93 12/01/2023    HDL 83.9 12/01/2023    HCT 40.1 12/01/2023    HGB 12.4 12/01/2023     12/01/2023    K 4.4 12/01/2023     12/01/2023    TRIG 41 12/01/2023    WBC 6.1 12/01/2023         ASSESSMENT:  1. Routine general medical examination at health care facility  1 Year Follow Up In [facility] - PCP - Wellness Exam      2. Moderate dementia without behavioral disturbance, psychotic disturbance, mood disturbance, or anxiety, unspecified dementia type (Multi)  Follow Up In [facility] - PCP - Established      3. Parkinson's disease, unspecified whether dyskinesia present, unspecified whether manifestations fluctuate (Multi)  Follow Up In [facility] PCP - Established      4. Generalized anxiety disorder  Follow Up In [facility] PCP [facility]    TSH with reflex to Free T4 if abnormal      5. Chronic kidney disease, stage 3a (Multi)  Follow Up In [facility] - PCP - Established    CBC and Auto Differential      6. Pure hypercholesterolemia  Comprehensive Metabolic Panel    Lipid Panel      7. Paroxysmal atrial fibrillation (Multi)        8. Urinary incontinence, unspecified type        9. Other specified peripheral vascular diseases (CMS-HCC)        10. Protein-calorie malnutrition, unspecified severity (Multi)            PLAN:  Orders Placed This Encounter   Procedures    CBC and Auto Differential     Standing Status:   Future     Standing Expiration Date:   6/3/2025     Order Specific Question:   Release result to MyChart     Answer:   Immediate    Comprehensive Metabolic Panel     Standing Status:   Future     Standing Expiration Date:   6/3/2025     Order Specific Question:   Release result to MyChart     Answer:   Immediate    Lipid Panel     Standing Status:   Future     Standing Expiration Date:   6/3/2025     Order Specific Question:   Release result to MyChart     Answer:   Immediate    TSH with reflex to Free T4 if abnormal      Standing Status:   Future     Standing Expiration Date:   6/3/2025     Order Specific Question:   Release result to [employer]     Answer:   Immediate     Patient was advised to take a supplement twice daily.  She has been doing it once daily already.    I have instructed the patient to follow-up with me in 6 months or sooner if needed.  She is to continue her current medications.

## 2024-06-03 NOTE — ASSESSMENT & PLAN NOTE
Protein calorie malnutrition is stable.  Patient to continue with current medications and treatment plan.  Follow-up at least annually.  Patient was advised to take a supplement twice daily.  She will take it between meals.

## 2024-06-03 NOTE — ASSESSMENT & PLAN NOTE
PVD is stable.  Patient to continue with current medications and treatment plan.  Follow-up at least annually.

## 2024-06-03 NOTE — PATIENT INSTRUCTIONS
Patient was advised to take a supplement twice daily.  She has been doing it once daily already.    I have instructed the patient to follow-up with me in 6 months or sooner if needed.  She is to continue her current medications.

## 2024-06-03 NOTE — PROGRESS NOTES
"Subjective   Reason for Visit: Nedra Quiels is an 88 y.o. female here for a Medicare Wellness visit.     Past Medical, Surgical, and Family History reviewed and updated in chart.         HPI    Patient Care Team:  Boston Peters MD as PCP - General  Boston Peters MD as PCP - [facility] Attributed Provider     Review of Systems    Objective   Vitals:  /62 (BP Location: Left arm)   Pulse 62   Temp 36.2 °C (97.2 °F)   Resp 16   Ht 1.549 m (5' 1\")   Wt (!) 43.1 kg (95 lb)   SpO2 93%   BMI 17.95 kg/m²       Physical Exam    Assessment/Plan   Problem List Items Addressed This Visit     Anxiety disorder    Chronic kidney disease, stage 3a (Multi)    Dementia (Multi)    Parkinsonism (Multi)   Other Visit Diagnoses     Routine general medical examination at health care facility    -  Primary    Relevant Orders    1 Year Follow Up In [facility] - PCP - Wellness Exam             "

## 2024-06-20 ENCOUNTER — APPOINTMENT (OUTPATIENT)
Dept: UROLOGY | Facility: CLINIC | Age: 89
End: 2024-06-20
Payer: MEDICARE

## 2024-06-20 VITALS
HEIGHT: 61 IN | DIASTOLIC BLOOD PRESSURE: 65 MMHG | SYSTOLIC BLOOD PRESSURE: 130 MMHG | BODY MASS INDEX: 18.5 KG/M2 | HEART RATE: 62 BPM | TEMPERATURE: 97.3 F | WEIGHT: 98 LBS

## 2024-06-20 DIAGNOSIS — N32.89 BLADDER INSTABILITY: ICD-10-CM

## 2024-06-20 DIAGNOSIS — R39.15 URGENCY OF URINATION: ICD-10-CM

## 2024-06-20 DIAGNOSIS — R33.9 URINARY RETENTION: ICD-10-CM

## 2024-06-20 DIAGNOSIS — R35.1 NOCTURIA: ICD-10-CM

## 2024-06-20 DIAGNOSIS — N39.41 URGE INCONTINENCE OF URINE: Primary | ICD-10-CM

## 2024-06-20 DIAGNOSIS — N39.0 RECURRENT UTI: ICD-10-CM

## 2024-06-20 PROCEDURE — 1157F ADVNC CARE PLAN IN RCRD: CPT | Performed by: NURSE PRACTITIONER

## 2024-06-20 PROCEDURE — G2211 COMPLEX E/M VISIT ADD ON: HCPCS | Performed by: NURSE PRACTITIONER

## 2024-06-20 PROCEDURE — 99213 OFFICE O/P EST LOW 20 MIN: CPT | Performed by: NURSE PRACTITIONER

## 2024-06-20 PROCEDURE — 51798 US URINE CAPACITY MEASURE: CPT | Performed by: NURSE PRACTITIONER

## 2024-06-20 PROCEDURE — 1159F MED LIST DOCD IN RCRD: CPT | Performed by: NURSE PRACTITIONER

## 2024-06-20 PROCEDURE — 1036F TOBACCO NON-USER: CPT | Performed by: NURSE PRACTITIONER

## 2024-06-20 RX ORDER — TROSPIUM CHLORIDE ER 60 MG/1
CAPSULE ORAL
Qty: 90 CAPSULE | Refills: 3 | Status: SHIPPED | OUTPATIENT
Start: 2024-06-20

## 2024-06-20 RX ORDER — ALUMINUM ZIRCONIUM OCTACHLOROHYDREX GLY 16 G/100G
GEL TOPICAL
COMMUNITY

## 2024-06-20 RX ORDER — SULFAMETHOXAZOLE AND TRIMETHOPRIM 800; 160 MG/1; MG/1
TABLET ORAL
Qty: 30 TABLET | Refills: 3 | Status: SHIPPED | OUTPATIENT
Start: 2024-06-20

## 2024-06-20 RX ORDER — VIBEGRON 75 MG/1
1 TABLET, FILM COATED ORAL DAILY
Qty: 90 TABLET | Refills: 3 | Status: SHIPPED | OUTPATIENT
Start: 2024-06-20

## 2024-06-20 NOTE — PROGRESS NOTES
06/20/24   [id number]    Urgency, urge incontinence, nocturia, dementia     Subjective      HPI Nedra Quiles is a 88 y.o. female who presents for follow up urgency, urge incontinence, nocturia, dementia.    In past year, seeing cardiology and gastroenterology; heart palpitations taking blood thinner now, loose stools and now added metamucil to diet; drinking water after the metamucil and causing her to drink more water before bed and two pads; metamucil is for loose stools.    Managed w combination Gemtesa 75 mg in evening, trospium ER 60 mg daily in a.m.    Bactrim twice weekly for UTI prevention    No UTIs, no hematuria, missed hat today, PVR 51 cc    Would like to continue on the Trospium and Gemtesa w concerns for confusion; with history of dementia and Beer's criteria;     Memory a little slower in past year able to do mathematics backwards and count back from 100 per daughter;      PMH, PSH, SH, FH reviewed.  PMH: Parkinson's Disease, Arthritis, Constipation, Reflux, Dementia  PSH: Cystocele repair, MUS  FH: no cancer  SH: non smoker      12/1/23 creatinine 0.93, GFR 60  10/25/21 URBANO exophytic L renal cyst, two R renal cysts stable, no stones, no hydro       Recap from 12/13/22 visit for detailed history  Ms. Quiles is a 86 y/o female here with long hx urinary incontinence previously managed by Dr. No w Trospium ER 60 mg 6 pm in evening daily and for recurrent UTIs taking Bactrim DS twice per week; Discontinued Terazosin 2 mg daily as incontinence hadn't improved; Has seen Dr. Cain in past for [place], mid urethral sling placement, cystoscopy, perineorrhaphy. Cystocele repair; Denies any recurrence of prolapse. Last visit as she noted her biggest issue was at night, we changed timing of Trospium to morning and started her on Myrbetriq 25 mg daily w supper which helped but patient called and said she needed higher dosage; This combination was working well but then insurance didn't cover the  "Myrbetriq but would cover Gemtesa, we gave her samples of this medication and she is here today for f/u.  cc this 8 a.m. Further discussion, patient had switched timing of medications and was now taking both medications Trospium and Gemtesa in evening before bedtime, No side effects. No UTIs or hematuria. Only up 1-2 x at night, DTF 4-5 x, no leakage, Still 60-70% better than prior to starting medications. Would like to continue on the Trospium and Myrbetriq w concerns for confusion with history of dementia and Beer's criteria;        Objective     /65   Pulse 62   Temp 36.3 °C (97.3 °F)   Ht 1.549 m (5' 1\")   Wt (!) 44.5 kg (98 lb)   BMI 18.52 kg/m²    Physical Exam  General: Appears comfortable and in no apparent distress, well nourished  Head: Normocephalic, atraumatic  Neck: trachea midline  Respiratory: respirations unlabored, no wheezes, and no use of accessory muscles  Cardiovascular: at rest no dyspnea, well perfused  Skin: no visible rashes or lesions  Neurologic: grossly intact, oriented to person, place, and time  Psychiatric: mood and affect appropriate  Musculoskeletal: in chair for appt. no difficulty w upper body movement      Assessment/Plan   Problem List Items Addressed This Visit          Genitourinary and Reproductive    Nocturia    Urge incontinence of urine - Primary    Urgency of urination    Urinary retention     No orders of the defined types were placed in this encounter.     Plans to continue trospium ER 60 mg in am before breakfast on empty stomach  Gemtesa 75 mg in evening  Bactrim twice weekly on Wed and Sun  Urine order in system to do in Dec when she does her blood work  Follow up one year, PVR       EVAN Panchal-CNP  Lab Results   Component Value Date    GLUCOSE 85 12/01/2023    CALCIUM 9.7 12/01/2023     12/01/2023    K 4.4 12/01/2023    CO2 33 (H) 12/01/2023     12/01/2023    BUN 28 (H) 12/01/2023    CREATININE 0.93 12/01/2023       "

## 2024-06-20 NOTE — PATIENT INSTRUCTIONS
Plans to continue trospium ER 60 mg in am before breakfast on empty stomach  Gemtesa 75 mg in evening  Bactrim twice weekly on Wed and Sun  Urine order in system to do in Dec when she does her blood work  Follow up one year, PVR

## 2024-07-31 ENCOUNTER — TELEPHONE (OUTPATIENT)
Dept: UROLOGY | Facility: CLINIC | Age: 89
End: 2024-07-31
Payer: MEDICARE

## 2024-07-31 NOTE — TELEPHONE ENCOUNTER
Approved  Prior Authorization Portal   Prior authorization approved  Payer:  [facility] Case ID: [id number]  Note from payer: CaseId:[id number];Status:Cancelled;Explanation:Prior Authorization currently on file.;Appeal Information:;  Electronic appeal: Not supported  View History  Medication Being Authorized    trospium (Sanctura XR) 60 mg 24 hour capsule  Take on empty stomach before breakfast  Dispense: 90 capsule Refills: 3   Start: 2024   Class: Normal Diagnoses: Bladder instability   This order has been released to its destination.  To be filled at: [facility] [address]  Pharmacy Benefits   Open Encounter LEE MARLEY DUNCAN  -  [employer] ([employer])    Covered: Retail, Mail Order    Unknown: Specialty, Long-Term Care  Member ID: [id number] BIN: [id number] : 1935   Group ID: [id number] PCN: [id number] Legal sex: F   Group name: DEPARTMENT [facility]   Address: [address] [place], [place] [id number]

## 2024-09-23 DIAGNOSIS — K21.9 GASTROESOPHAGEAL REFLUX DISEASE WITHOUT ESOPHAGITIS: ICD-10-CM

## 2024-09-23 RX ORDER — OMEPRAZOLE 40 MG/1
40 CAPSULE, DELAYED RELEASE ORAL
Qty: 90 CAPSULE | Refills: 1 | Status: SHIPPED | OUTPATIENT
Start: 2024-09-23

## 2024-09-23 NOTE — TELEPHONE ENCOUNTER
Rx Refill Request Telephone Encounter    Name:  Nedra Quiles  :  [id number]  Medication Name:  omeprazole (Prilosec) 40 mg   Specific Pharmacy location:  [facility]  Date of last appointment:  6/3  Date of next appointment:  12/3  Best number to reach patient:  [phone]           Patient's daughter requesting a 90 day supply to [facility]    THIS MEDICATIONS IS USUALLY PRESCRIBED BY DR TURNER

## 2024-12-03 ENCOUNTER — APPOINTMENT (OUTPATIENT)
Dept: PRIMARY CARE | Facility: CLINIC | Age: 89
End: 2024-12-03
Payer: MEDICARE

## 2024-12-03 ENCOUNTER — LAB (OUTPATIENT)
Dept: LAB | Facility: LAB | Age: 89
End: 2024-12-03
Payer: MEDICARE

## 2024-12-03 VITALS
OXYGEN SATURATION: 96 % | WEIGHT: 96 LBS | RESPIRATION RATE: 16 BRPM | HEIGHT: 61 IN | SYSTOLIC BLOOD PRESSURE: 122 MMHG | DIASTOLIC BLOOD PRESSURE: 64 MMHG | TEMPERATURE: 97.2 F | BODY MASS INDEX: 18.12 KG/M2 | HEART RATE: 91 BPM

## 2024-12-03 DIAGNOSIS — E46 PROTEIN-CALORIE MALNUTRITION, UNSPECIFIED SEVERITY (MULTI): ICD-10-CM

## 2024-12-03 DIAGNOSIS — E78.00 PURE HYPERCHOLESTEROLEMIA: ICD-10-CM

## 2024-12-03 DIAGNOSIS — G20.B2 PARKINSON'S DISEASE WITH DYSKINESIA AND FLUCTUATING MANIFESTATIONS: ICD-10-CM

## 2024-12-03 DIAGNOSIS — N18.31 CHRONIC KIDNEY DISEASE, STAGE 3A (MULTI): ICD-10-CM

## 2024-12-03 DIAGNOSIS — R41.89 COGNITIVE DECLINE: ICD-10-CM

## 2024-12-03 DIAGNOSIS — I48.0 PAROXYSMAL ATRIAL FIBRILLATION (MULTI): ICD-10-CM

## 2024-12-03 DIAGNOSIS — F41.1 GENERALIZED ANXIETY DISORDER: ICD-10-CM

## 2024-12-03 DIAGNOSIS — F03.B0 MODERATE DEMENTIA WITHOUT BEHAVIORAL DISTURBANCE, PSYCHOTIC DISTURBANCE, MOOD DISTURBANCE, OR ANXIETY, UNSPECIFIED DEMENTIA TYPE: Primary | ICD-10-CM

## 2024-12-03 DIAGNOSIS — Z13.1 SCREENING FOR DIABETES MELLITUS: ICD-10-CM

## 2024-12-03 DIAGNOSIS — K21.9 GASTROESOPHAGEAL REFLUX DISEASE WITHOUT ESOPHAGITIS: ICD-10-CM

## 2024-12-03 LAB
ALBUMIN SERPL BCP-MCNC: 4.5 G/DL (ref 3.4–5)
ALP SERPL-CCNC: 65 U/L (ref 33–136)
ALT SERPL W P-5'-P-CCNC: 10 U/L (ref 7–45)
ANION GAP SERPL CALC-SCNC: 9 MMOL/L (ref 10–20)
AST SERPL W P-5'-P-CCNC: 24 U/L (ref 9–39)
BASOPHILS # BLD AUTO: 0.03 X10*3/UL (ref 0–0.1)
BASOPHILS NFR BLD AUTO: 0.5 %
BILIRUB SERPL-MCNC: 0.7 MG/DL (ref 0–1.2)
BUN SERPL-MCNC: 32 MG/DL (ref 6–23)
CALCIUM SERPL-MCNC: 9.7 MG/DL (ref 8.6–10.3)
CHLORIDE SERPL-SCNC: 105 MMOL/L (ref 98–107)
CHOLEST SERPL-MCNC: 189 MG/DL (ref 0–199)
CHOLESTEROL/HDL RATIO: 2.3
CO2 SERPL-SCNC: 35 MMOL/L (ref 21–32)
CREAT SERPL-MCNC: 1.18 MG/DL (ref 0.5–1.05)
EGFRCR SERPLBLD CKD-EPI 2021: 45 ML/MIN/1.73M*2
EOSINOPHIL # BLD AUTO: 0.05 X10*3/UL (ref 0–0.4)
EOSINOPHIL NFR BLD AUTO: 0.8 %
ERYTHROCYTE [DISTWIDTH] IN BLOOD BY AUTOMATED COUNT: 13.7 % (ref 11.5–14.5)
GLUCOSE SERPL-MCNC: 91 MG/DL (ref 74–99)
HCT VFR BLD AUTO: 42.4 % (ref 36–46)
HDLC SERPL-MCNC: 81.2 MG/DL
HGB BLD-MCNC: 12.9 G/DL (ref 12–16)
IMM GRANULOCYTES # BLD AUTO: 0.01 X10*3/UL (ref 0–0.5)
IMM GRANULOCYTES NFR BLD AUTO: 0.2 % (ref 0–0.9)
LDLC SERPL CALC-MCNC: 101 MG/DL
LYMPHOCYTES # BLD AUTO: 1.96 X10*3/UL (ref 0.8–3)
LYMPHOCYTES NFR BLD AUTO: 32.1 %
MCH RBC QN AUTO: 30.9 PG (ref 26–34)
MCHC RBC AUTO-ENTMCNC: 30.4 G/DL (ref 32–36)
MCV RBC AUTO: 102 FL (ref 80–100)
MONOCYTES # BLD AUTO: 0.69 X10*3/UL (ref 0.05–0.8)
MONOCYTES NFR BLD AUTO: 11.3 %
NEUTROPHILS # BLD AUTO: 3.37 X10*3/UL (ref 1.6–5.5)
NEUTROPHILS NFR BLD AUTO: 55.1 %
NON HDL CHOLESTEROL: 108 MG/DL (ref 0–149)
NRBC BLD-RTO: 0 /100 WBCS (ref 0–0)
PLATELET # BLD AUTO: 198 X10*3/UL (ref 150–450)
POTASSIUM SERPL-SCNC: 4.8 MMOL/L (ref 3.5–5.3)
PROT SERPL-MCNC: 7.2 G/DL (ref 6.4–8.2)
RBC # BLD AUTO: 4.17 X10*6/UL (ref 4–5.2)
SODIUM SERPL-SCNC: 144 MMOL/L (ref 136–145)
TRIGL SERPL-MCNC: 36 MG/DL (ref 0–149)
TSH SERPL-ACNC: 3.49 MIU/L (ref 0.44–3.98)
VLDL: 7 MG/DL (ref 0–40)
WBC # BLD AUTO: 6.1 X10*3/UL (ref 4.4–11.3)

## 2024-12-03 PROCEDURE — 1036F TOBACCO NON-USER: CPT | Performed by: FAMILY MEDICINE

## 2024-12-03 PROCEDURE — 80053 COMPREHEN METABOLIC PANEL: CPT

## 2024-12-03 PROCEDURE — G2211 COMPLEX E/M VISIT ADD ON: HCPCS | Performed by: FAMILY MEDICINE

## 2024-12-03 PROCEDURE — 1160F RVW MEDS BY RX/DR IN RCRD: CPT | Performed by: FAMILY MEDICINE

## 2024-12-03 PROCEDURE — 1157F ADVNC CARE PLAN IN RCRD: CPT | Performed by: FAMILY MEDICINE

## 2024-12-03 PROCEDURE — 1123F ACP DISCUSS/DSCN MKR DOCD: CPT | Performed by: FAMILY MEDICINE

## 2024-12-03 PROCEDURE — 1159F MED LIST DOCD IN RCRD: CPT | Performed by: FAMILY MEDICINE

## 2024-12-03 PROCEDURE — 80061 LIPID PANEL: CPT

## 2024-12-03 PROCEDURE — 36415 COLL VENOUS BLD VENIPUNCTURE: CPT

## 2024-12-03 PROCEDURE — 1158F ADVNC CARE PLAN TLK DOCD: CPT | Performed by: FAMILY MEDICINE

## 2024-12-03 PROCEDURE — 99214 OFFICE O/P EST MOD 30 MIN: CPT | Performed by: FAMILY MEDICINE

## 2024-12-03 PROCEDURE — 84443 ASSAY THYROID STIM HORMONE: CPT

## 2024-12-03 PROCEDURE — 85025 COMPLETE CBC W/AUTO DIFF WBC: CPT

## 2024-12-03 ASSESSMENT — PATIENT HEALTH QUESTIONNAIRE - PHQ9
2. FEELING DOWN, DEPRESSED OR HOPELESS: NOT AT ALL
SUM OF ALL RESPONSES TO PHQ9 QUESTIONS 1 AND 2: 0
1. LITTLE INTEREST OR PLEASURE IN DOING THINGS: NOT AT ALL

## 2024-12-03 NOTE — PROGRESS NOTES
HPI: Nedra Quiles is a 89 y.o. female presenting for follow-up of Follow-up (Moderate dementia without behavioral disturbance, psychotic disturbance, mood disturbance, or anxiety, unspecified dementia type; Parkinson's disease, unspecified whether dyskinesia present, unspecified whether manifestations fluctuate; Generalized anxiety disorder; Chronic kidney disease, stage 3a (Multi)/)  . I last saw the patient 6/3/2024      Patient is here for a F/U, her daughter is with her today  Pt already got flu vaccine    Past medical, surgical, and family history reviewed.  Reviewed and documented all medications   Pt eating well, exercising as tolerated and taking medications as directed    Patient lost around 2 pounds from her last visit. But daughter says that her appetite is normal. Patient does drink nutritional supplement once daily.    Patient does independently in an apartment.    Patient had orange juice today in the morning and is willing to give her blood sample for outstanding labs.    ROS    Except positives as noted in the CC & HPI   Constitutional: Denies fevers, chills, night sweats, fatigue, weight changes, change in appetite   Eyes: Denies blurry vision, double vision   ENT: Denies otalgia, trouble hearing, tinnitus, vertigo, nasal congestion, rhinorrhea, sore throat   Neck: Denies swelling, masses   Cardiovascular: Denies chest pain, palpitations, edema, orthopnea, syncope   Respiratory: Denies dyspnea, cough, wheezing, postural nocturnal dyspnea   Gastrointestinal: Denies abdominal pain, nausea, vomiting, diarrhea, constipation, melena, hematochezia   Genitourinary: Denies dysuria, hematuria, frequency, urgency   Musculoskeletal: Denies back pain, neck pain, arthralgias, myalgias   Integumentary: Denies skin lesions, rashes, masses   Neurological: Denies dizziness, headaches, confusion, limb weakness, paresthesias, syncope, convulsions   Psychiatric: Denies depression, anxiety, homicidal ideations,  "suicidal ideations, sleep disturbances   Endocrine: Denies polyphagia, polydipsia, polyuria, weakness, hair thinning, heat intolerance, cold intolerance, weight changes   Heme/Lymph: Denies easy bruising, easy bleeding, swollen glands     Vitals:    12/03/24 0803 12/03/24 0823   BP: 122/72 122/64   BP Location:  Right arm   Patient Position:  Sitting   BP Cuff Size:  Adult long   Pulse: 91    Resp: 16    Temp: 36.2 °C (97.2 °F)    SpO2: 96%    Weight: (!) 43.5 kg (96 lb)    Height: 1.549 m (5' 1\")      PHYSICAL EXAM    122/64 on recheck of BP in the right arm.     GENERAL APPEARANCE: well-developed, well-nourished, 89 y.o. female in no acute distress.    SKIN: warm, pink and dry without rash or concerning lesions.    MENTAL STATUS: alert and oriented × 3. Normal mood and affect appropriate to mood.    NECK: supple without lymphadenopathy. Carotid pulses are normal without bruits. Thyroid - is normal in midline without nodules.    CHEST: lungs are clear to auscultation without rales, rhonchi or wheezes.    HEART: Irregularly irregular, rate and rhythm without murmurs, rubs or gallops.    ABDOMEN: soft, flat, nondistended. No masses, hepatomegaly or splenomegaly is noted.    EXTREMITIES: no cyanosis, clubbing or edema. Pedal pulses are 2+ normal at the dorsalis pedis and posterior tibial pulses bilaterally.    NEUROLOGICAL: cranial nerves II through XII are grossly intact. Motor strength 5/5 at all fours.     Below is the patient's most recent value for Albumin, ALT, AST, BUN, Calcium, Chloride, Cholesterol, CO2, Creatinine, GFR, Glucose, HDL, Hematocrit, Hemoglobin, Hemoglobin A1C, LDL, Magnesium, Phosphorus, Platelets, Potassium, PSA, Sodium, Triglycerides, and WBC.   Lab Results   Component Value Date    ALBUMIN 4.6 12/01/2023    ALT 12 12/01/2023    AST 26 12/01/2023    BUN 28 (H) 12/01/2023    CALCIUM 9.7 12/01/2023     12/01/2023    CHOL 170 12/01/2023    CO2 33 (H) 12/01/2023    CREATININE 0.93 12/01/2023 "    HDL 83.9 12/01/2023    HCT 40.1 12/01/2023    HGB 12.4 12/01/2023     12/01/2023    K 4.4 12/01/2023     12/01/2023    TRIG 41 12/01/2023    WBC 6.1 12/01/2023     ASSESSMENT:  1. Moderate dementia without behavioral disturbance, psychotic disturbance, mood disturbance, or anxiety, unspecified dementia type        2. Paroxysmal atrial fibrillation (Multi)        3. Pure hypercholesterolemia        4. Protein-calorie malnutrition, unspecified severity (Multi)        5. Gastroesophageal reflux disease without esophagitis        6. Chronic kidney disease, stage 3a (Multi)        7. Cognitive decline        8. Parkinson's disease with dyskinesia and fluctuating manifestations        9. Body mass index (BMI) less than 19        10. Screening for diabetes mellitus             PLAN:  Patient to continue current medications (with any exceptions as noted) and diet. Follow-up in 6 month(s) otherwise as needed.      Will obtain CBC and Auto Differential, Comprehensive Metabolic Panel, Lipid profile, TSH with reflex free T4 if abnormal today. Will call patient with results when available.     Patient is to follow up with Dr. Malone (Uro), Dr. Marinelli (Neuro) and cardiologist as scheduled.    Scribe Attestation  By signing my name below, IDaisy Scribe   attest that this documentation has been prepared under the direction and in the presence of Boston Peters MD.

## 2024-12-08 NOTE — RESULT ENCOUNTER NOTE
Please call the patient regarding her abnormal result.  Renal function is mildly decreased.  Recommend patient drink 36-48 ounces of water per day.  T.Chol 189 , HDL 81, TG 36. Follow low cholesterol, low fat diet and exercise as tolerated.  CBC is normal.  TSH is normal at 3.49.

## 2024-12-30 ENCOUNTER — APPOINTMENT (OUTPATIENT)
Dept: PRIMARY CARE | Facility: CLINIC | Age: 89
End: 2024-12-30
Payer: MEDICARE

## 2024-12-30 VITALS
RESPIRATION RATE: 16 BRPM | HEART RATE: 120 BPM | SYSTOLIC BLOOD PRESSURE: 112 MMHG | WEIGHT: 96 LBS | OXYGEN SATURATION: 98 % | BODY MASS INDEX: 18.12 KG/M2 | HEIGHT: 61 IN | DIASTOLIC BLOOD PRESSURE: 54 MMHG | TEMPERATURE: 97.5 F

## 2024-12-30 DIAGNOSIS — R29.6 FREQUENT FALLS: ICD-10-CM

## 2024-12-30 DIAGNOSIS — R29.898 WEAKNESS OF BOTH LOWER EXTREMITIES: ICD-10-CM

## 2024-12-30 DIAGNOSIS — S09.90XA CLOSED HEAD INJURY, INITIAL ENCOUNTER: Primary | ICD-10-CM

## 2024-12-30 DIAGNOSIS — S01.01XA LACERATION OF SCALP, INITIAL ENCOUNTER: ICD-10-CM

## 2024-12-30 DIAGNOSIS — S22.008A: ICD-10-CM

## 2024-12-30 PROCEDURE — 1159F MED LIST DOCD IN RCRD: CPT | Performed by: FAMILY MEDICINE

## 2024-12-30 PROCEDURE — 1160F RVW MEDS BY RX/DR IN RCRD: CPT | Performed by: FAMILY MEDICINE

## 2024-12-30 PROCEDURE — 99215 OFFICE O/P EST HI 40 MIN: CPT | Performed by: FAMILY MEDICINE

## 2024-12-30 PROCEDURE — 1157F ADVNC CARE PLAN IN RCRD: CPT | Performed by: FAMILY MEDICINE

## 2024-12-30 PROCEDURE — 1123F ACP DISCUSS/DSCN MKR DOCD: CPT | Performed by: FAMILY MEDICINE

## 2024-12-30 PROCEDURE — S0630 REMOVAL OF SUTURES: HCPCS | Performed by: FAMILY MEDICINE

## 2024-12-30 NOTE — PROGRESS NOTES
HPI: Nedra Quiles is a 89 y.o. female presenting for follow-up of Follow-up  . I last saw the patient 12/3/2024. Her son and daughter are here with her.      [facility] ; staple removal from head ; ER took patient off eliquis due to too many falls. She was told that she has compression fractures of her spine for which she is seeing Dr. Sheth in neurosurgery.     Patient mentions having upper back pain.    Review pts recent labs    Pts daughter mentions she has been having multiple falls lately and the are worried about her. Patient has been started on home PT.    Patient's eliquis has been discontinued due to repeated falling. She will follow up with her cardiologist to discuss about jose procedure. Family also need to consider alternative living procedures such as assisted living.    Past medical, surgical, and family history reviewed.  Reviewed and documented all medications   Pt eating well, exercising as tolerated and taking medications as directed      ROS    Except positives as noted in the CC & HPI   Constitutional: Denies fevers, chills, night sweats, fatigue, weight changes, change in appetite   Eyes: Denies blurry vision, double vision   ENT: Denies otalgia, trouble hearing, tinnitus, vertigo, nasal congestion, rhinorrhea, sore throat   Neck: Denies swelling, masses   Cardiovascular: Denies chest pain, palpitations, edema, orthopnea, syncope   Respiratory: Denies dyspnea, cough, wheezing, postural nocturnal dyspnea   Gastrointestinal: Denies abdominal pain, nausea, vomiting, diarrhea, constipation, melena, hematochezia   Genitourinary: Denies dysuria, hematuria, frequency, urgency   Musculoskeletal: Denies back pain, neck pain, arthralgias, myalgias   Integumentary: Denies skin lesions, rashes, masses   Neurological: Denies dizziness, headaches, confusion, limb weakness, paresthesias, syncope, convulsions   Psychiatric: Denies depression, anxiety, homicidal ideations, suicidal  "ideations, sleep disturbances   Endocrine: Denies polyphagia, polydipsia, polyuria, weakness, hair thinning, heat intolerance, cold intolerance, weight changes   Heme/Lymph: Denies easy bruising, easy bleeding, swollen glands     Vitals:    12/30/24 1201 12/30/24 1315   BP:  112/54   Pulse: (!) 116 (!) 120   Resp: 16    Temp: 36.4 °C (97.5 °F)    SpO2: 98%    Weight: (!) 43.5 kg (96 lb)    Height: 1.549 m (5' 1\")          PHYSICAL EXAM    GENERAL APPEARANCE: well-developed, well-nourished, 89 y.o. female in no acute distress. Patient is ambulating with a walker.    SKIN: warm, pink and dry without rash or concerning lesions. Scalp reveals 1 intact staple which was removed from the vertex.    MENTAL STATUS: alert and oriented × 3. Normal mood and affect appropriate to mood.    NECK: supple without lymphadenopathy. Carotid pulses are normal without bruits. Thyroid - is normal in midline without nodules.    CHEST: lungs are clear to auscultation without rales, rhonchi or wheezes.    HEART: irregularly irregular, rate and rhythm without murmurs, rubs or gallops.    ABDOMEN: soft, flat, nondistended. No masses, hepatomegaly or splenomegaly is noted.    BACK - Lumbar spine reveals kyphosis and scoliosis. Straight leg raises were negative bilaterally. Motor strength is 5/5 at the lower extremities.    EXTREMITIES: no cyanosis, clubbing or edema. Pedal pulses are 2+ normal at the dorsalis pedis and posterior tibial pulses bilaterally.    NEUROLOGICAL: cranial nerves II through XII are grossly intact. Motor strength 5/5 at all fours.     Below is the patient's most recent value for Albumin, ALT, AST, BUN, Calcium, Chloride, Cholesterol, CO2, Creatinine, GFR, Glucose, HDL, Hematocrit, Hemoglobin, Hemoglobin A1C, LDL, Magnesium, Phosphorus, Platelets, Potassium, PSA, Sodium, Triglycerides, and WBC.   Lab Results   Component Value Date    ALBUMIN 4.5 12/03/2024    ALT 10 12/03/2024    AST 24 12/03/2024    BUN 32 (H) 12/03/2024 "    CALCIUM 9.7 12/03/2024     12/03/2024    CHOL 189 12/03/2024    CO2 35 (H) 12/03/2024    CREATININE 1.18 (H) 12/03/2024    HDL 81.2 12/03/2024    HCT 42.4 12/03/2024    HGB 12.9 12/03/2024     12/03/2024    K 4.8 12/03/2024     12/03/2024    TRIG 36 12/03/2024    WBC 6.1 12/03/2024         ASSESSMENT:  1. Closed head injury, initial encounter        2. Laceration of scalp, initial encounter        3. Frequent falls        4. Weakness of both lower extremities              PLAN:  No orders of the defined types were placed in this encounter.    Patient to continue current medications (with any exceptions as noted) and diet. Follow-up in 1 month(s), otherwise as needed.      For weight management I recommend exercising and remaining physically active. Try to maintain a heathy diet that includes green leafy vegetables, fruits and proteins. Patient was encouraged to stay well hydrated.     We will obtain the testing done at [facility] for review. Consider chest Xray and CT chest for evaluation of her hypoxemia. Continue with home PT.    Recommended patient to contact for help if she has another fall. Patient was advised to use walker at all times when ambulatory.    Patient is to follow up with Dr. Sheth (Neuro surgery) and Dr. Solis (Uro) as scheduled.      Scribe Attestation  By signing my name below, IDaisy , Cara   attest that this documentation has been prepared under the direction and in the presence of Boston Peters MD.

## 2025-02-28 LAB
NON-UH HIE BASO COUNT: 0.02 X1000 (ref 0–0.2)
NON-UH HIE BASOS %: 0.4 %
NON-UH HIE BUN/CREAT RATIO: 38
NON-UH HIE BUN: 38 MG/DL (ref 9–23)
NON-UH HIE CALCIUM: 9.4 MG/DL (ref 8.7–10.4)
NON-UH HIE CALCULATED OSMOLALITY: 297 MOSM/KG (ref 275–295)
NON-UH HIE CHLORIDE: 106 MMOL/L (ref 98–107)
NON-UH HIE CO2, VENOUS: 32 MMOL/L (ref 20–31)
NON-UH HIE CREATININE: 1 MG/DL (ref 0.5–0.8)
NON-UH HIE DIFF?: ABNORMAL
NON-UH HIE EOS COUNT: 0.04 X1000 (ref 0–0.5)
NON-UH HIE EOSIN %: 0.7 %
NON-UH HIE GFR AA: >60
NON-UH HIE GLOMERULAR FILTRATION RATE: 52 ML/MIN/1.73M?
NON-UH HIE GLUCOSE: 91 MG/DL (ref 74–106)
NON-UH HIE HCT: 36.3 % (ref 36–46)
NON-UH HIE HGB: 12 G/DL (ref 12–16)
NON-UH HIE INSTR WBC: 5.8
NON-UH HIE K: 4.9 MMOL/L (ref 3.5–5.1)
NON-UH HIE LYMPH %: 19.7 %
NON-UH HIE LYMPH COUNT: 1.14 X1000 (ref 1.2–4.8)
NON-UH HIE MCH: 31.7 PG (ref 27–34)
NON-UH HIE MCHC: 33.2 G/DL (ref 32–37)
NON-UH HIE MCV: 95.6 FL (ref 80–100)
NON-UH HIE MONO %: 11.2 %
NON-UH HIE MONO COUNT: 0.65 X1000 (ref 0.1–1)
NON-UH HIE MPV: 8.5 FL (ref 7.4–10.4)
NON-UH HIE NA: 145 MMOL/L (ref 135–145)
NON-UH HIE NEUTROPHIL %: 68 %
NON-UH HIE NEUTROPHIL COUNT (ANC): 3.93 X1000 (ref 1.4–8.8)
NON-UH HIE NUCLEATED RBC: 0 /100WBC
NON-UH HIE PLATELET: 195 X10 (ref 150–450)
NON-UH HIE RBC: 3.79 X10 (ref 4.2–5.4)
NON-UH HIE RDW: 14.1 % (ref 11.5–14.5)
NON-UH HIE WBC: 5.8 X10 (ref 4.5–11)

## 2025-03-11 ENCOUNTER — TELEPHONE (OUTPATIENT)
Dept: PRIMARY CARE | Facility: CLINIC | Age: 89
End: 2025-03-11
Payer: MEDICARE

## 2025-03-11 DIAGNOSIS — R29.898 WEAKNESS OF BOTH LOWER EXTREMITIES: ICD-10-CM

## 2025-03-11 DIAGNOSIS — R26.9 ABNORMALITY OF GAIT AND MOBILITY: ICD-10-CM

## 2025-03-11 DIAGNOSIS — R29.6 FREQUENT FALLS: Primary | ICD-10-CM

## 2025-03-11 NOTE — TELEPHONE ENCOUNTER
Daughter called in, asked if Dr. Peters could place an order for in-home physical therapy to build leg strength and help with her walking. Nedra has had a few falls lately. Please contact Esmer at [phone] to let her know if/when orders have been placed.

## 2025-03-12 ENCOUNTER — HOME HEALTH ADMISSION (OUTPATIENT)
Dept: HOME HEALTH SERVICES | Facility: HOME HEALTH | Age: 89
End: 2025-03-12
Payer: MEDICARE

## 2025-03-12 ENCOUNTER — DOCUMENTATION (OUTPATIENT)
Dept: HOME HEALTH SERVICES | Facility: HOME HEALTH | Age: 89
End: 2025-03-12
Payer: MEDICARE

## 2025-03-12 NOTE — HH CARE COORDINATION
Home Care received a Referral for Physical Therapy. We have processed the referral for a Start of Care on 3/13-3/14/25.     If you have any questions or concerns, please feel free to contact us at [phone]. Follow the prompts, enter your five digit zip code, and you will be directed to your care team on [facility].

## 2025-03-13 ENCOUNTER — HOME CARE VISIT (OUTPATIENT)
Dept: HOME HEALTH SERVICES | Facility: HOME HEALTH | Age: 89
End: 2025-03-13
Payer: MEDICARE

## 2025-03-13 VITALS — HEART RATE: 58 BPM | SYSTOLIC BLOOD PRESSURE: 132 MMHG | DIASTOLIC BLOOD PRESSURE: 77 MMHG

## 2025-03-13 PROCEDURE — G0151 HHCP-SERV OF PT,EA 15 MIN: HCPCS | Mod: HHH

## 2025-03-13 PROCEDURE — 169592 NO-PAY CLAIM PROCEDURE

## 2025-03-13 ASSESSMENT — ENCOUNTER SYMPTOMS
DENIES PAIN: 1
PERSON REPORTING PAIN: PATIENT
HYPERTENSION: 1
OCCASIONAL FEELINGS OF UNSTEADINESS: 1

## 2025-03-13 ASSESSMENT — ACTIVITIES OF DAILY LIVING (ADL)
OASIS_M1830: 05
ENTERING_EXITING_HOME: MINIMUM ASSIST
AMBULATION ASSISTANCE ON FLAT SURFACES: 1

## 2025-03-13 NOTE — HOME HEALTH
patient seen for pt admission into [facility] services today. she has not been in the hospital recently.   ordered [facility] pt due to difficulty walking , decline in function and frequent falls .  she lives alone in an apt with no steps but has good family support .prior to this she walks with a ww , indep with dressing and bathing .

## 2025-03-17 ENCOUNTER — HOME CARE VISIT (OUTPATIENT)
Dept: HOME HEALTH SERVICES | Facility: HOME HEALTH | Age: 89
End: 2025-03-17
Payer: MEDICARE

## 2025-03-17 PROCEDURE — G0157 HHC PT ASSISTANT EA 15: HCPCS | Mod: CQ,HHH

## 2025-03-17 ASSESSMENT — ENCOUNTER SYMPTOMS
DENIES PAIN: 1
PERSON REPORTING PAIN: PATIENT

## 2025-03-18 ENCOUNTER — TELEPHONE (OUTPATIENT)
Dept: HOME HEALTH SERVICES | Facility: HOME HEALTH | Age: 89
End: 2025-03-18
Payer: MEDICARE

## 2025-03-18 NOTE — TELEPHONE ENCOUNTER
Boston Peters MD      [facility] received an order for Nedra  to receive [facility] services for Physical Therapy .?     ?Per CMS guidelines:  R29.6 (ICD-10-CM) - Frequent falls , R26.9 (ICD-10-CM) - Abnormality of gait and mobility and R29.898 (ICD-10-CM) - Weakness of both lower extremities  ?are not approved [facility] diagnoses.?     ?Please update your office note from 12/30/2024  and enter a new referral with the diagnosis & cause of the R29.6 (ICD-10-CM) - Frequent falls , R26.9 (ICD-10-CM) - Abnormality of gait and mobility and R29.898 (ICD-10-CM) - Weakness of both lower extremities.   ? Which upon review  the primary diagnosis and reason for home physical therapy it appears  to be related to ?   Parkinson's disease  G20  which the patient falls resulted in  Fracture of spinous process S12.100A          ?Once completed please  let us know place a new order for [facility] services.?     Thank you,??     CLARA SERRANO LPN     Referral

## 2025-03-19 ENCOUNTER — HOME CARE VISIT (OUTPATIENT)
Dept: HOME HEALTH SERVICES | Facility: HOME HEALTH | Age: 89
End: 2025-03-19
Payer: MEDICARE

## 2025-03-19 PROCEDURE — G0157 HHC PT ASSISTANT EA 15: HCPCS | Mod: CQ,HHH

## 2025-03-19 ASSESSMENT — ENCOUNTER SYMPTOMS
PERSON REPORTING PAIN: PATIENT
DENIES PAIN: 1

## 2025-03-24 ENCOUNTER — HOME CARE VISIT (OUTPATIENT)
Dept: HOME HEALTH SERVICES | Facility: HOME HEALTH | Age: 89
End: 2025-03-24
Payer: MEDICARE

## 2025-03-24 PROCEDURE — G0157 HHC PT ASSISTANT EA 15: HCPCS | Mod: CQ,HHH

## 2025-03-24 ASSESSMENT — ENCOUNTER SYMPTOMS
DENIES PAIN: 1
PERSON REPORTING PAIN: PATIENT

## 2025-03-26 ENCOUNTER — HOME CARE VISIT (OUTPATIENT)
Dept: HOME HEALTH SERVICES | Facility: HOME HEALTH | Age: 89
End: 2025-03-26
Payer: MEDICARE

## 2025-03-26 PROCEDURE — G0157 HHC PT ASSISTANT EA 15: HCPCS | Mod: CQ,HHH

## 2025-03-26 ASSESSMENT — ENCOUNTER SYMPTOMS
PERSON REPORTING PAIN: PATIENT
DENIES PAIN: 1

## 2025-03-31 ENCOUNTER — HOME CARE VISIT (OUTPATIENT)
Dept: HOME HEALTH SERVICES | Facility: HOME HEALTH | Age: 89
End: 2025-03-31
Payer: MEDICARE

## 2025-03-31 PROCEDURE — G0157 HHC PT ASSISTANT EA 15: HCPCS | Mod: CQ,HHH

## 2025-03-31 ASSESSMENT — ENCOUNTER SYMPTOMS
DENIES PAIN: 1
PERSON REPORTING PAIN: PATIENT

## 2025-04-02 ENCOUNTER — HOME CARE VISIT (OUTPATIENT)
Dept: HOME HEALTH SERVICES | Facility: HOME HEALTH | Age: 89
End: 2025-04-02
Payer: MEDICARE

## 2025-04-02 PROCEDURE — G0157 HHC PT ASSISTANT EA 15: HCPCS | Mod: CQ,HHH

## 2025-04-02 ASSESSMENT — ENCOUNTER SYMPTOMS
DENIES PAIN: 1
PERSON REPORTING PAIN: PATIENT

## 2025-04-08 ENCOUNTER — HOME CARE VISIT (OUTPATIENT)
Dept: HOME HEALTH SERVICES | Facility: HOME HEALTH | Age: 89
End: 2025-04-08
Payer: MEDICARE

## 2025-04-08 PROCEDURE — G0157 HHC PT ASSISTANT EA 15: HCPCS | Mod: CQ,HHH

## 2025-04-08 ASSESSMENT — ENCOUNTER SYMPTOMS
PERSON REPORTING PAIN: PATIENT
DENIES PAIN: 1

## 2025-04-11 ENCOUNTER — HOME CARE VISIT (OUTPATIENT)
Dept: HOME HEALTH SERVICES | Facility: HOME HEALTH | Age: 89
End: 2025-04-11
Payer: MEDICARE

## 2025-04-11 PROCEDURE — G0151 HHCP-SERV OF PT,EA 15 MIN: HCPCS | Mod: HHH

## 2025-04-11 SDOH — HEALTH STABILITY: PHYSICAL HEALTH: EXERCISE TYPE: SITTING , STANDING EXS X 15 REPS

## 2025-04-11 ASSESSMENT — ENCOUNTER SYMPTOMS
DENIES PAIN: 1
PERSON REPORTING PAIN: PATIENT

## 2025-04-11 ASSESSMENT — ACTIVITIES OF DAILY LIVING (ADL)
HOME_HEALTH_OASIS: 00
AMBULATION ASSISTANCE ON FLAT SURFACES: 1
OASIS_M1830: 01

## 2025-04-11 NOTE — HOME HEALTH
patient seen for agency dc today .  she states she is happy with her [facility] services and the care the care that was provided.  she denies any questions or concerns re her hep or activity.  she agrees with dc today

## 2025-04-21 NOTE — PROGRESS NOTES
Subjective   Patient ID: Nedra Quiles is a 89 y.o. female who presents for Follow Up of Hypercholesterolemia, CKD and Anxiety . I last saw the patient on 12/30/2024  . Her daughter is with her today.     HPI  Patient states that the home visits helped. She is finished now with strengthening exercises.   Patient has still been having falls but denies any injury. The most recent fall was about a week and a half ago.    Patient had stitches placed after a fall 2 months ago and received stitches in the back of her head.   Patient is taking a protein supplement once daily.    Past medical, surgical, and family history reviewed.  Reviewed and documented all medications   Pt eating well, exercising as tolerated and taking medications as directed.      Review of Systems  Except positives as noted in the CC & HPI      Constitutional: Denies fevers, chills, night sweats, fatigue, weight changes, change in appetite    Eyes: Denies blurry vision, double vision    ENT: Denies otalgia, trouble hearing, tinnitus, vertigo, nasal congestion, rhinorrhea, sore throat    Neck: Denies swelling, masses    Cardiovascular: Denies chest pain, palpitations, edema, orthopnea, syncope    Respiratory: Denies dyspnea, cough, wheezing, postural nocturnal dyspnea    Gastrointestinal: Denies abdominal pain, nausea, vomiting, diarrhea, constipation, melena, hematochezia    Genitourinary: Denies dysuria, hematuria  Musculoskeletal: Denies back pain, neck pain, arthralgias, myalgias    Integumentary: Denies skin lesions, rashes, masses    Neurological: Denies dizziness, headaches, confusion, limb weakness, paresthesias, syncope, convulsions    Psychiatric: Denies depression, anxiety, homicidal ideations, suicidal ideations, sleep disturbances    Endocrine: Denies polyphagia, polydipsia, polyuria, weakness, hair thinning, heat intolerance, cold intolerance, weight changes    Heme/Lymph: Denies easy bruising, easy bleeding, swollen glands    Objective  Subjective:      Patient ID: María Wilson is a 53 y.o. female.    Vitals:  weight is 87.1 kg (192 lb). Her oral temperature is 98.2 °F (36.8 °C). Her blood pressure is 141/105 (abnormal) and her pulse is 86. Her respiration is 16 and oxygen saturation is 98%.     Chief Complaint: Insect Bite (Stung by unknown insect yesterday morning.  The area started itching and burning 12 hrs later.)    Patient is a 53-year-old female with a past medical history of hypertension, ADHD, diverticulitis, restless leg syndrome, and insomnia who presents to clinic for evaluation of insect sting.  Patient reports insect sting occurred yesterday morning.  Patient states she was stung on her left forearm.  Patient states a couple of hours later the area become swollen, red, itchy, and having intermittent in stinging sensations.  Patient states no over-the-counter medications for symptoms at this point.  Patient denies any drainage or bleeding from the area.  Patient denies any abnormal sensation including numbness or tingling of the left upper extremity.  Patient denies any other symptoms at this point.    Insect Bite  This is a new problem. The current episode started yesterday. Associated symptoms include joint swelling (Left forearm at insect sting). Pertinent negatives include no abdominal pain, chest pain, chills, coughing, diaphoresis, fatigue, fever, headaches, nausea, numbness, sore throat or vomiting.     Constitution: Negative. Negative for chills, sweating, fatigue and fever.   HENT: Negative.  Negative for drooling, facial swelling, sore throat and trouble swallowing.    Neck: neck negative.   Cardiovascular: Negative.  Negative for chest pain and palpitations.   Eyes: Negative.    Respiratory: Negative.  Negative for chest tightness, cough and shortness of breath.    Gastrointestinal: Negative.  Negative for abdominal pain, nausea, vomiting and diarrhea.   Endocrine: negative.   Genitourinary: Negative.    Musculoskeletal:   "  Vitals:    04/22/25 0908 04/22/25 0944   BP: 124/62 108/56   BP Location:  Right arm   Patient Position:  Sitting   BP Cuff Size:  Large adult   Pulse: 68    Resp: 16    Temp: 36.8 °C (98.2 °F)    SpO2: 97%    Weight: (!) 42.2 kg (93 lb)    Height: 1.549 m (5' 1\")       Physical Exam  GENERAL APPEARANCE: well-developed, well-nourished, 89 y.o. female in no acute distress. Patient is ambulating with a walker.     SKIN: warm, pink and dry without rash or concerning lesions.      MENTAL STATUS: alert and oriented × 3. Normal mood and affect appropriate to mood.     NECK: supple without lymphadenopathy. Carotid pulses are normal without bruits. Thyroid - is normal in midline without nodules.     CHEST: lungs are clear to auscultation without rales, rhonchi or wheezes.     HEART: irregularly irregular, rate and rhythm without murmurs, rubs or gallops.     ABDOMEN: soft, flat, nondistended. No masses, hepatomegaly or splenomegaly is noted.     BACK - Lumbar spine reveals kyphosis and scoliosis. Straight leg raises were negative bilaterally. Motor strength is 5/5 at the lower extremities.     EXTREMITIES: no cyanosis, clubbing or edema. Pedal pulses are 2+ normal at the dorsalis pedis and posterior tibial pulses bilaterally.     NEUROLOGICAL: cranial nerves II through XII are grossly intact. Motor strength 5/5 at all fours.    Assessment   1. Chronic kidney disease, stage 3a (Multi)  Albumin-Creatinine Ratio, Urine Random    Albumin-Creatinine Ratio, Urine Random    CANCELED: Albumin-Creatinine Ratio, Urine Random    CANCELED: Albumin-Creatinine Ratio, Urine Random      2. Paroxysmal atrial fibrillation (Multi)        3. Protein-calorie malnutrition, unspecified severity (Multi)        4. Parkinson's disease with dyskinesia and fluctuating manifestations        5. Syncope, unspecified syncope type        6. Moderate dementia without behavioral disturbance, psychotic disturbance, mood disturbance, or anxiety, unspecified " Positive for joint swelling (Left forearm at insect sting).   Skin:  Positive for erythema (Left forearm).        Insect sting   Allergic/Immunologic: Positive for itching (Left forearm at insect sting).   Neurological: Negative.  Negative for dizziness, headaches, disorientation, altered mental status, numbness and tingling.   Hematologic/Lymphatic: Negative.    Psychiatric/Behavioral: Negative.  Negative for altered mental status, disorientation and confusion.     Objective:     Physical Exam   Constitutional: She is oriented to person, place, and time. She appears well-developed. She is cooperative.  Non-toxic appearance. She does not appear ill. No distress.   HENT:   Head: Normocephalic and atraumatic.   Ears:   Right Ear: Hearing, tympanic membrane, external ear and ear canal normal.   Left Ear: Hearing, tympanic membrane, external ear and ear canal normal.   Nose: Nose normal. No mucosal edema or nasal deformity. No epistaxis. Right sinus exhibits no maxillary sinus tenderness and no frontal sinus tenderness. Left sinus exhibits no maxillary sinus tenderness and no frontal sinus tenderness.   Mouth/Throat: Uvula is midline, oropharynx is clear and moist and mucous membranes are normal. No trismus in the jaw. Normal dentition. No uvula swelling.   Eyes: Conjunctivae and lids are normal. Pupils are equal, round, and reactive to light.   Neck: Trachea normal and phonation normal. Neck supple.   Cardiovascular: Normal rate, regular rhythm and normal pulses.   Pulmonary/Chest: Effort normal and breath sounds normal. No respiratory distress. She has no wheezes. She has no rhonchi. She has no rales.   Abdominal: Normal appearance and bowel sounds are normal. She exhibits no distension. Soft. There is no abdominal tenderness.   Musculoskeletal: Normal range of motion.         General: Normal range of motion.   Neurological: She is alert and oriented to person, place, and time. She exhibits normal muscle tone.   Skin:  Skin is warm, dry, intact and not diaphoretic. Capillary refill takes less than 2 seconds. erythema (Left forearm)        Psychiatric: Her speech is normal and behavior is normal. Judgment and thought content normal.   Nursing note and vitals reviewed.    Assessment:     1. Insect stings, accidental or unintentional, initial encounter        Plan:       Insect stings, accidental or unintentional, initial encounter    Other orders  -     dexAMETHasone injection 8 mg                Decadron 8 mg IM in clinic.  Patient tolerated well.  No complications noted.    Recommend Pepcid 40 mg and Zyrtec 10 mg daily for the next 5-7 days.    Tylenol/Motrin per package instructions for any pain or fever.    Follow-up with PCP in 1-2 days.    Return to clinic as needed.    To ED for any new or acutely worsening symptoms.    Patient in agreement with plan of care.    DISCLAIMER: Please note that my documentation in this Electronic Healthcare Record was produced using speech recognition software and therefore may contain errors related to that software system.These could include grammar, punctuation and spelling errors or the inclusion/exclusion of phrases that were not intended. Garbled syntax, mangled pronouns, and other bizarre constructions may be attributed to that software system.      dementia type        7. Pure hypercholesterolemia          Patient to continue current medications (with any exceptions as noted) and diet. Follow-up in 6 month(s) otherwise as needed.      Ordered Albumin-Creatinine Ratio, Urine Random. Will call patient with results when available.       Encouraged patient to drink plenty of fluids, at least 48 oz of water daily.      Patient is to slow down with when using her Rolator.    Patient encouraged to increase her protein supplement to twice daily.    Patient is to follow up with urology and neurology as scheduled.     Scribe Attestation  By signing my name below, IDaisy Scribe   attest that this documentation has been prepared under the direction and in the presence of Boston Peters MD.

## 2025-04-22 ENCOUNTER — APPOINTMENT (OUTPATIENT)
Dept: PRIMARY CARE | Facility: CLINIC | Age: 89
End: 2025-04-22
Payer: MEDICARE

## 2025-04-22 VITALS
RESPIRATION RATE: 16 BRPM | BODY MASS INDEX: 17.56 KG/M2 | OXYGEN SATURATION: 97 % | SYSTOLIC BLOOD PRESSURE: 108 MMHG | TEMPERATURE: 98.2 F | HEIGHT: 61 IN | DIASTOLIC BLOOD PRESSURE: 56 MMHG | HEART RATE: 68 BPM | WEIGHT: 93 LBS

## 2025-04-22 DIAGNOSIS — N18.31 CHRONIC KIDNEY DISEASE, STAGE 3A (MULTI): Primary | ICD-10-CM

## 2025-04-22 DIAGNOSIS — F03.B0 MODERATE DEMENTIA WITHOUT BEHAVIORAL DISTURBANCE, PSYCHOTIC DISTURBANCE, MOOD DISTURBANCE, OR ANXIETY, UNSPECIFIED DEMENTIA TYPE: ICD-10-CM

## 2025-04-22 DIAGNOSIS — G20.B2 PARKINSON'S DISEASE WITH DYSKINESIA AND FLUCTUATING MANIFESTATIONS: ICD-10-CM

## 2025-04-22 DIAGNOSIS — E78.00 PURE HYPERCHOLESTEROLEMIA: ICD-10-CM

## 2025-04-22 DIAGNOSIS — R55 SYNCOPE, UNSPECIFIED SYNCOPE TYPE: ICD-10-CM

## 2025-04-22 DIAGNOSIS — I48.0 PAROXYSMAL ATRIAL FIBRILLATION (MULTI): ICD-10-CM

## 2025-04-22 DIAGNOSIS — E46 PROTEIN-CALORIE MALNUTRITION, UNSPECIFIED SEVERITY (MULTI): ICD-10-CM

## 2025-04-22 PROCEDURE — 1159F MED LIST DOCD IN RCRD: CPT | Performed by: FAMILY MEDICINE

## 2025-04-22 PROCEDURE — 99214 OFFICE O/P EST MOD 30 MIN: CPT | Performed by: FAMILY MEDICINE

## 2025-04-22 PROCEDURE — 1123F ACP DISCUSS/DSCN MKR DOCD: CPT | Performed by: FAMILY MEDICINE

## 2025-04-22 PROCEDURE — G2211 COMPLEX E/M VISIT ADD ON: HCPCS | Performed by: FAMILY MEDICINE

## 2025-04-22 PROCEDURE — 1157F ADVNC CARE PLAN IN RCRD: CPT | Performed by: FAMILY MEDICINE

## 2025-04-22 ASSESSMENT — ANXIETY QUESTIONNAIRES

## 2025-04-22 ASSESSMENT — PATIENT HEALTH QUESTIONNAIRE - PHQ9
SUM OF ALL RESPONSES TO PHQ9 QUESTIONS 1 AND 2: 0
SUM OF ALL RESPONSES TO PHQ9 QUESTIONS 1 & 2: 0
1. LITTLE INTEREST OR PLEASURE IN DOING THINGS: NOT AT ALL
2. FEELING DOWN, DEPRESSED OR HOPELESS: NOT AT ALL
2. FEELING DOWN, DEPRESSED OR HOPELESS: NOT AT ALL
1. LITTLE INTEREST OR PLEASURE IN DOING THINGS: NOT AT ALL

## 2025-04-22 ASSESSMENT — ENCOUNTER SYMPTOMS
DEPRESSION: 0
LOSS OF SENSATION IN FEET: 0
OCCASIONAL FEELINGS OF UNSTEADINESS: 1

## 2025-04-23 DIAGNOSIS — K21.9 GASTROESOPHAGEAL REFLUX DISEASE WITHOUT ESOPHAGITIS: Primary | ICD-10-CM

## 2025-04-23 LAB
ALBUMIN/CREAT UR: 22 MG/G CREAT
CREAT UR-MCNC: 96 MG/DL (ref 20–275)
MICROALBUMIN UR-MCNC: 2.1 MG/DL

## 2025-04-23 NOTE — TELEPHONE ENCOUNTER
Rx Refill Request Telephone Encounter    Name:  Nedra Quiles  :  [id number]  Medication Name:  omeprazole (PriLOSEC) 40 mg   Specific Pharmacy location:  [facility]  Date of last appointment:    Date of next appointment:    Best number to reach patient:  [phone]

## 2025-04-24 RX ORDER — APIXABAN 2.5 MG/1
2.5 TABLET, FILM COATED ORAL
COMMUNITY
Start: 2025-03-10 | End: 2025-04-28 | Stop reason: WASHOUT

## 2025-04-25 RX ORDER — OMEPRAZOLE 40 MG/1
40 CAPSULE, DELAYED RELEASE ORAL
Qty: 90 CAPSULE | Refills: 1 | Status: SHIPPED | OUTPATIENT
Start: 2025-04-25

## 2025-04-28 ENCOUNTER — APPOINTMENT (OUTPATIENT)
Dept: NEUROLOGY | Facility: CLINIC | Age: 89
End: 2025-04-28
Payer: MEDICARE

## 2025-04-28 VITALS
DIASTOLIC BLOOD PRESSURE: 86 MMHG | HEART RATE: 73 BPM | WEIGHT: 93 LBS | HEIGHT: 61 IN | BODY MASS INDEX: 17.56 KG/M2 | SYSTOLIC BLOOD PRESSURE: 149 MMHG

## 2025-04-28 DIAGNOSIS — G20.A1 PARKINSON'S DISEASE, UNSPECIFIED WHETHER DYSKINESIA PRESENT, UNSPECIFIED WHETHER MANIFESTATIONS FLUCTUATE: Primary | ICD-10-CM

## 2025-04-28 DIAGNOSIS — F03.B0 MODERATE DEMENTIA WITHOUT BEHAVIORAL DISTURBANCE, PSYCHOTIC DISTURBANCE, MOOD DISTURBANCE, OR ANXIETY, UNSPECIFIED DEMENTIA TYPE: ICD-10-CM

## 2025-04-28 PROCEDURE — 1160F RVW MEDS BY RX/DR IN RCRD: CPT | Performed by: PSYCHIATRY & NEUROLOGY

## 2025-04-28 PROCEDURE — 1036F TOBACCO NON-USER: CPT | Performed by: PSYCHIATRY & NEUROLOGY

## 2025-04-28 PROCEDURE — 1157F ADVNC CARE PLAN IN RCRD: CPT | Performed by: PSYCHIATRY & NEUROLOGY

## 2025-04-28 PROCEDURE — 1123F ACP DISCUSS/DSCN MKR DOCD: CPT | Performed by: PSYCHIATRY & NEUROLOGY

## 2025-04-28 PROCEDURE — 99214 OFFICE O/P EST MOD 30 MIN: CPT | Performed by: PSYCHIATRY & NEUROLOGY

## 2025-04-28 PROCEDURE — 1159F MED LIST DOCD IN RCRD: CPT | Performed by: PSYCHIATRY & NEUROLOGY

## 2025-04-28 PROCEDURE — G2211 COMPLEX E/M VISIT ADD ON: HCPCS | Performed by: PSYCHIATRY & NEUROLOGY

## 2025-04-28 RX ORDER — CARBIDOPA AND LEVODOPA 25; 100 MG/1; MG/1
1 TABLET ORAL 3 TIMES DAILY
Qty: 270 TABLET | Refills: 3 | Status: SHIPPED | OUTPATIENT
Start: 2025-04-28 | End: 2026-04-28

## 2025-04-28 RX ORDER — DONEPEZIL HYDROCHLORIDE 10 MG/1
10 TABLET, FILM COATED ORAL DAILY
Qty: 90 TABLET | Refills: 2 | Status: SHIPPED | OUTPATIENT
Start: 2025-04-28 | End: 2026-04-28

## 2025-04-28 ASSESSMENT — UNIFIED PARKINSONS DISEASE RATING SCALE (UPDRS)
SPONTANEITY_OF_MOVEMENT: 1
FINGER_TAPPING_LEFT: 1
PRONATION_SUPINATION_RIGHT: 0
AMPLITUDE_RUE: 0
POSTURAL_TREMOR_RIGHTHAND: 0
LEG_AGILITY_LEFT: 2
TOTAL_SCORE: 37
FACIAL_EXPRESSION: 1
RIGIDITY_RLE: 2
RIGIDITY_NECK: 1
CLINICAL_STATE: ON
RIGIDITY_LUE: 1
POSTURAL_STABILITY: 2
HANDMOVEMENTS_RIGHT: 1
CONSTANCY_TREMOR_ATREST: 1
RIGIDITY_RUE: 2
KINETIC_TREMOR_LEFTHAND: 1
RIGIDITY_LLE: 1
AMPLITUDE_LUE: 1
POSTURE: 3
LEVODOPA: YES
CHAIR_RISING_SCALE: 2
FINGER_TAPPING_RIGHT: 1
KINETIC_TREMOR_RIGHTHAND: 1
AMPLITUDE_RLE: 0
PARKINSONS_MEDS: YES
PRONATION_SUPINATION_LEFT: 0
SPEECH: 2
AMPLITUDE_LLE: 0
POSTURAL_TREMOR_LEFTHAND: 0
GAIT: 3
FREEZING_GAIT: 0
TOETAPPING_LEFT: 2
AMPLITUDE_LIP_JAW: 0
LEG_AGILITY_RIGHT: 1
TOETAPPING_RIGHT: 2

## 2025-04-28 ASSESSMENT — ANXIETY QUESTIONNAIRES
6. BECOMING EASILY ANNOYED OR IRRITABLE: NOT AT ALL
IF YOU CHECKED OFF ANY PROBLEMS ON THIS QUESTIONNAIRE, HOW DIFFICULT HAVE THESE PROBLEMS MADE IT FOR YOU TO DO YOUR WORK, TAKE CARE OF THINGS AT HOME, OR GET ALONG WITH OTHER PEOPLE: NOT DIFFICULT AT ALL
1. FEELING NERVOUS, ANXIOUS, OR ON EDGE: NOT AT ALL
7. FEELING AFRAID AS IF SOMETHING AWFUL MIGHT HAPPEN: NOT AT ALL
2. NOT BEING ABLE TO STOP OR CONTROL WORRYING: NOT AT ALL
5. BEING SO RESTLESS THAT IT IS HARD TO SIT STILL: NOT AT ALL
3. WORRYING TOO MUCH ABOUT DIFFERENT THINGS: NOT AT ALL
4. TROUBLE RELAXING: NOT AT ALL
GAD7 TOTAL SCORE: 0

## 2025-04-28 ASSESSMENT — PATIENT HEALTH QUESTIONNAIRE - PHQ9
1. LITTLE INTEREST OR PLEASURE IN DOING THINGS: NOT AT ALL
SUM OF ALL RESPONSES TO PHQ9 QUESTIONS 1 AND 2: 0
2. FEELING DOWN, DEPRESSED OR HOPELESS: NOT AT ALL

## 2025-04-28 ASSESSMENT — ENCOUNTER SYMPTOMS
DEPRESSION: 0
LOSS OF SENSATION IN FEET: 0
OCCASIONAL FEELINGS OF UNSTEADINESS: 0

## 2025-04-28 NOTE — PROGRESS NOTES
"Subjective     Nedra Quiles is a right handed  89 y.o. year old female who presents with Parkinson's Disease. Patient is accompanied by: child daughter  Visit type: follow up visit   89 year old female with parkinsonism and cognitive impairment.     Last seen a year back, at which point a MoCA showed socre of 20/30, and no changes were made to her medications.   She lives in a senior complex.   They have delivered meals. She does her own shopping, goes w friends. Her family lives close by as well. She has noticed that she is a little slower in movement and thinking but feels that age appropriate.     She feels that she is doing okay and has no specific concerns.   She has discontinued Eliquis due to falls.     Relevant meds:  Carbidopa/levodopa 25/100 one tab tid  Donepezil 10 mg daily  No motor fluctuations, no nausea, no VH.     Review of Motor symptoms:  Tremor:  Location- arms shake, R>L                 Rest/postural/action- w action.   Stiffness/rigidity: denies  Slowness:  yes, slowing down a little  Trouble walking:  walking w  a walker. She does shuffle.   Freezing of gait:  denies  Balance problems:  yes,   Falls:  yes, has fallen a few times. Tripped she thinks or not paying attention when walking.   Changes in speech:  soft speech.   Swallowing difficulties:  denies  Activities of daily living (buttoning clothes, bathing, cutting food, etc):  denies. Lives alone. Lives in a senior apartment, has a group of seniors that she hangs with, and her kids watch out. She has been doing a lot of exercises.     Review of Non-Motor symptoms:  Cognition:  Memory- daughter states \" an 89 year old memory\". Sometimes has to think about things before she knows.         Hallucinations-  denies         Mood:        Depression-  denies                       Anxiety: denies  Sleep disturbances including REM behavior disorder: sleeps well. No RBD  Sensory changes (ie, smell or taste):  denies  Gastrointestinal " complaints/Constipation:  occasional constipation, usually has diarrhea.   Urinary retention or frequency:  incontinence, sees urology.   Positional lightheadedness and/or syncope:  denies  Excessive saliva/drooling:  denies      MoCA score review:  2016: 27/30, 23/30 in 2019, 24/30 in 2021, 20/30 in 2024.           Patient Active Problem List   Diagnosis    Abnormality of gait and mobility    Anxiety disorder    Bladder instability    Chronic kidney disease, stage 3a (Multi)    Cognitive decline    Dementia    Contusion of back, initial encounter    Gastroesophageal reflux disease without esophagitis    Hordeolum externum of right lower eyelid    Macrocytic anemia    Nocturia    Parkinsonism (Multi)    Pure hypercholesterolemia    Renal cysts, acquired, bilateral    Syncope    Urge incontinence of urine    Urgency of urination    Dysuria    Urinary incontinence    Urinary retention    Body mass index (BMI) less than 19    Protein-calorie malnutrition, unspecified severity (Multi)    Paroxysmal atrial fibrillation (Multi)    Other specified peripheral vascular diseases      Past Medical History:   Diagnosis Date    Residual hemorrhoidal skin tags     External hemorrhoids      Past Surgical History:   Procedure Laterality Date    BLADDER SURGERY  09/19/2016    Bladder Surgery    CHOLECYSTECTOMY  09/03/2014    Cholecystectomy Laparoscopic      Social History     Socioeconomic History    Marital status:      Spouse name: Not on file    Number of children: Not on file    Years of education: Not on file    Highest education level: Not on file   Occupational History    Not on file   Tobacco Use    Smoking status: Never     Passive exposure: Never    Smokeless tobacco: Never   Substance and Sexual Activity    Alcohol use: Never    Drug use: Never    Sexual activity: Not on file   Other Topics Concern    Not on file   Social History Narrative    Not on file     Social Drivers of Health     Financial Resource Strain:  Not on file   Food Insecurity: Not on file   Transportation Needs: No Transportation Needs (2025)    [place]IS : Transportation     Lack of Transportation (Medical): No     Lack of Transportation (Non-Medical): No     Patient Unable or Declines to Respond: No   Physical Activity: Not on file   Stress: Not on file   Social Connections: Feeling Socially Integrated (2025)    OASIS : Social Isolation     Frequency of experiencing loneliness or isolation: Never   Intimate Partner Violence: Not on file   Housing Stability: Not on file      Family History   Problem Relation Name Age of Onset    Tuberculosis Mother      Hypertension Father        Patient Health Questionnaire-2 Score: 0          Review of Systems  All other system have been reviewed and are negative for complaint.  Objective   Vitals:    25 1004   BP: 149/86   Pulse: 73        Neurological Exam  Mental status: oriented to month, day of week, year, can name current and past president. Recall at 5 mins: /3    MDS UPDRS 1st Score: Motor Examination  Is the patient on medication for treating the symptoms of Parkinson's Disease?: Yes  Patients receiving medication for treating the symptoms of Parkinson's Disease, shante the patient's clinical state.: On  Is the patient on Levodopa?: Yes  Speech: 2  Facial Expression: 1  Rigidty Neck: 1  Rigidty RUE: 2  Rigidity - LUE: 1  Rigidity RLE: 2  Rigidity LLE: 1  Finger Tapping Right Hand: 1  Finger Tapping Left Hand: 1  Hand Movements- Right Hand: 1  Hand Movements- Left Hand: 2  Pronatiaon-Supination Movments - Right Hand: 0  Pronatiaon-Supination Movments Left Hand: 0  Toe Tapping Right Foot: 2  Toe Tapping - Left Foot: 2  Leg Agility - Right Le  Leg Agility - Left le  Arising from Chair: 2  Gait: 3  Freezing of Gait: 0  Postural Stability: 2  Posture: 3  Global Spontanteity of Movment ( Body Bradykinesia): 1  Postural Tremor - Right Hand: 0  Postural Tremor - Left hand: 0  Kinetic Tremor -  Right hand: 1  Kinetic Tremor - Left hand: 1  Rest Tremor Amplitude - RUE: 0  Rest Tremor Amplitude - LUE: 1  Rest Tremor Amplitude - RLE: 0  Rest Tremor Amplitude - LLE: 0  Rest Tremor Amplitude - Lip/Jaw: 0  Constancy of Rest Tremor: 1  MDS UPDRS Total Score: 37                  Thyroid Stimulating Hormone   Date Value Ref Range Status   12/03/2024 3.49 0.44 - 3.98 mIU/L Final     Folate   Date Value Ref Range Status   04/15/2020 20.8 >5.0 ng/mL Final     Comment:     Low           <3.4  Borderline 3.4-5.0  Normal        >5.0  .   Biotin interference may cause falsely elevated results.    Patients taking a Biotin dose of up to 5 mg/day should    refrain from taking Biotin for 24 hours before sample    collection. Providers may contact their local laboratory   for further information.             Assessment/Plan       Nedra Quiles is a 89 y.o. year old female here for follow up of parkinsonism and cognitive impairment. Overall she continues to be stable, although needs to work on her nutrition to retain muscle mass.    Plan:  Continue carbidopa/levodopa 25/100 - one tab three times a day  Continue Donepezil 10 mg   Kepe taking multivitamins.   Yearly check of b12, folic acid and tsh with pcp.   RTC in 12 months.     This is a chronic neurologic condition that requires ongoing care and monitoring. This is a complex, serious condition that needs long term care going forward. Between myself and the patient we will be changing direction of care depending on responses to treatment.   Today we discussed medication options, non medication options for management and various other symptoms that are in relation to this disease.  I will continue to be involved in the care of this patient.

## 2025-04-28 NOTE — LETTER
April 28, 2025     Boston Peters MD  [address] [address]    Patient: Nedra Quiles   YOB: 1935   Date of Visit: 4/28/2025       Dear Dr. Boston Peters MD:    Thank you for referring Nedra Quiles to me for evaluation. Below are my notes for this consultation.  If you have questions, please do not hesitate to call me. I look forward to following your patient along with you.       Sincerely,     Ellen Marinelli MD      CC: No Recipients  ______________________________________________________________________________________    Subjective    Nedra Quiles is a right handed  89 y.o. year old female who presents with Parkinson's Disease. Patient is accompanied by: child daughter  Visit type: follow up visit   89 year old female with parkinsonism and cognitive impairment.     Last seen a year back, at which point a MoCA showed socre of 20/30, and no changes were made to her medications.   She lives in a senior complex.   They have delivered meals. She does her own shopping, goes w friends. Her family lives close by as well. She has noticed that she is a little slower in movement and thinking but feels that age appropriate.     She feels that she is doing okay and has no specific concerns.   She has discontinued Eliquis due to falls.     Relevant meds:  Carbidopa/levodopa 25/100 one tab tid  Donepezil 10 mg daily  No motor fluctuations, no nausea, no VH.     Review of Motor symptoms:  Tremor:  Location- arms shake, R>L                 Rest/postural/action- w action.   Stiffness/rigidity: denies  Slowness:  yes, slowing down a little  Trouble walking:  walking w  a walker. She does shuffle.   Freezing of gait:  denies  Balance problems:  yes,   Falls:  yes, has fallen a few times. Tripped she thinks or not paying attention when walking.   Changes in speech:  soft speech.   Swallowing difficulties:  denies  Activities of daily living (buttoning clothes, bathing, cutting food,  "etc):  denies. Lives alone. Lives in a senior apartment, has a group of seniors that she hangs with, and her kids watch out. She has been doing a lot of exercises.     Review of Non-Motor symptoms:  Cognition:  Memory- daughter states \" an 89 year old memory\". Sometimes has to think about things before she knows.         Hallucinations-  denies         Mood:        Depression-  denies                       Anxiety: denies  Sleep disturbances including REM behavior disorder: sleeps well. No RBD  Sensory changes (ie, smell or taste):  denies  Gastrointestinal complaints/Constipation:  occasional constipation, usually has diarrhea.   Urinary retention or frequency:  incontinence, sees urology.   Positional lightheadedness and/or syncope:  denies  Excessive saliva/drooling:  denies      MoCA score review:  2016: 27/30, 23/30 in 2019, 24/30 in 2021, 20/30 in 2024.           Patient Active Problem List   Diagnosis   • Abnormality of gait and mobility   • Anxiety disorder   • Bladder instability   • Chronic kidney disease, stage 3a (Multi)   • Cognitive decline   • Dementia   • Contusion of back, initial encounter   • Gastroesophageal reflux disease without esophagitis   • Hordeolum externum of right lower eyelid   • Macrocytic anemia   • Nocturia   • Parkinsonism (Multi)   • Pure hypercholesterolemia   • Renal cysts, acquired, bilateral   • Syncope   • Urge incontinence of urine   • Urgency of urination   • Dysuria   • Urinary incontinence   • Urinary retention   • Body mass index (BMI) less than 19   • Protein-calorie malnutrition, unspecified severity (Multi)   • Paroxysmal atrial fibrillation (Multi)   • Other specified peripheral vascular diseases      Past Medical History:   Diagnosis Date   • Residual hemorrhoidal skin tags     External hemorrhoids      Past Surgical History:   Procedure Laterality Date   • BLADDER SURGERY  09/19/2016    Bladder Surgery   • CHOLECYSTECTOMY  09/03/2014    Cholecystectomy Laparoscopic "      Social History     Socioeconomic History   • Marital status:      Spouse name: Not on file   • Number of children: Not on file   • Years of education: Not on file   • Highest education level: Not on file   Occupational History   • Not on file   Tobacco Use   • Smoking status: Never     Passive exposure: Never   • Smokeless tobacco: Never   Substance and Sexual Activity   • Alcohol use: Never   • Drug use: Never   • Sexual activity: Not on file   Other Topics Concern   • Not on file   Social History Narrative   • Not on file     Social Drivers of Health     Financial Resource Strain: Not on file   Food Insecurity: Not on file   Transportation Needs: No Transportation Needs (4/11/2025)    OASIS : Transportation    • Lack of Transportation (Medical): No    • Lack of Transportation (Non-Medical): No    • Patient Unable or Declines to Respond: No   Physical Activity: Not on file   Stress: Not on file   Social Connections: Feeling Socially Integrated (4/11/2025)    OASIS : Social Isolation    • Frequency of experiencing loneliness or isolation: Never   Intimate Partner Violence: Not on file   Housing Stability: Not on file      Family History   Problem Relation Name Age of Onset   • Tuberculosis Mother     • Hypertension Father        Patient Health Questionnaire-2 Score: 0          Review of Systems  All other system have been reviewed and are negative for complaint.  Objective  Vitals:    04/28/25 1004   BP: 149/86   Pulse: 73        Neurological Exam  Mental status: oriented to month, day of week, year, can name current and past president. Recall at 5 mins: 1/3    MDS UPDRS 1st Score: Motor Examination  Is the patient on medication for treating the symptoms of Parkinson's Disease?: Yes  Patients receiving medication for treating the symptoms of Parkinson's Disease, shante the patient's clinical state.: On  Is the patient on Levodopa?: Yes  Speech: 2  Facial Expression: 1  Rigidty Neck: 1  Rigidty  RUE: 2  Rigidity - LUE: 1  Rigidity RLE: 2  Rigidity LLE: 1  Finger Tapping Right Hand: 1  Finger Tapping Left Hand: 1  Hand Movements- Right Hand: 1  Hand Movements- Left Hand: 2  Pronatiaon-Supination Movments - Right Hand: 0  Pronatiaon-Supination Movments Left Hand: 0  Toe Tapping Right Foot: 2  Toe Tapping - Left Foot: 2  Leg Agility - Right Le  Leg Agility - Left le  Arising from Chair: 2  Gait: 3  Freezing of Gait: 0  Postural Stability: 2  Posture: 3  Global Spontanteity of Movment ( Body Bradykinesia): 1  Postural Tremor - Right Hand: 0  Postural Tremor - Left hand: 0  Kinetic Tremor - Right hand: 1  Kinetic Tremor - Left hand: 1  Rest Tremor Amplitude - RUE: 0  Rest Tremor Amplitude - LUE: 1  Rest Tremor Amplitude - RLE: 0  Rest Tremor Amplitude - LLE: 0  Rest Tremor Amplitude - Lip/Jaw: 0  Constancy of Rest Tremor: 1  MDS UPDRS Total Score: 37                  Thyroid Stimulating Hormone   Date Value Ref Range Status   2024 3.49 0.44 - 3.98 mIU/L Final     Folate   Date Value Ref Range Status   04/15/2020 20.8 >5.0 ng/mL Final     Comment:     Low           <3.4  Borderline 3.4-5.0  Normal        >5.0  .   Biotin interference may cause falsely elevated results.    Patients taking a Biotin dose of up to 5 mg/day should    refrain from taking Biotin for 24 hours before sample    collection. Providers may contact their local laboratory   for further information.             Assessment/Plan      Nedra Quiles is a 89 y.o. year old female here for follow up of parkinsonism and cognitive impairment. Overall she continues to be stable, although needs to work on her nutrition to retain muscle mass.    Plan:  Continue carbidopa/levodopa 25/100 - one tab three times a day  Continue Donepezil 10 mg   Kepe taking multivitamins.   Yearly check of b12, folic acid and tsh with pcp.   RTC in 12 months.     This is a chronic neurologic condition that requires ongoing care and monitoring. This is a complex,  serious condition that needs long term care going forward. Between myself and the patient we will be changing direction of care depending on responses to treatment.   Today we discussed medication options, non medication options for management and various other symptoms that are in relation to this disease.  I will continue to be involved in the care of this patient.

## 2025-04-28 NOTE — PATIENT INSTRUCTIONS
It was nice to see you today.     Continue carbidopa/levodopa 25/100 - one tab three times a day  2.   Continue Donepezil 10 mg   3.   Keep taking multivitamins and also make sure to drink protein shakes.   4.   Yearly check of b12, folic acid and tsh with pcp.   5.   RTC in 12 months.   6.   Try to exercise.

## 2025-06-04 ENCOUNTER — APPOINTMENT (OUTPATIENT)
Dept: PRIMARY CARE | Facility: CLINIC | Age: 89
End: 2025-06-04
Payer: MEDICARE

## 2025-06-19 ENCOUNTER — APPOINTMENT (OUTPATIENT)
Dept: UROLOGY | Facility: CLINIC | Age: 89
End: 2025-06-19
Payer: MEDICARE

## 2025-06-19 VITALS
HEART RATE: 64 BPM | DIASTOLIC BLOOD PRESSURE: 80 MMHG | SYSTOLIC BLOOD PRESSURE: 148 MMHG | WEIGHT: 95 LBS | BODY MASS INDEX: 17.94 KG/M2 | HEIGHT: 61 IN | TEMPERATURE: 98.4 F

## 2025-06-19 DIAGNOSIS — N39.41 URGE INCONTINENCE OF URINE: Primary | ICD-10-CM

## 2025-06-19 DIAGNOSIS — R39.15 URGENCY OF URINATION: ICD-10-CM

## 2025-06-19 DIAGNOSIS — N39.0 RECURRENT UTI: ICD-10-CM

## 2025-06-19 DIAGNOSIS — N32.89 BLADDER INSTABILITY: ICD-10-CM

## 2025-06-19 DIAGNOSIS — R35.1 NOCTURIA: ICD-10-CM

## 2025-06-19 PROCEDURE — 1159F MED LIST DOCD IN RCRD: CPT | Performed by: NURSE PRACTITIONER

## 2025-06-19 PROCEDURE — G2211 COMPLEX E/M VISIT ADD ON: HCPCS | Performed by: NURSE PRACTITIONER

## 2025-06-19 PROCEDURE — 51798 US URINE CAPACITY MEASURE: CPT | Performed by: NURSE PRACTITIONER

## 2025-06-19 PROCEDURE — 99213 OFFICE O/P EST LOW 20 MIN: CPT | Performed by: NURSE PRACTITIONER

## 2025-06-19 PROCEDURE — 1036F TOBACCO NON-USER: CPT | Performed by: NURSE PRACTITIONER

## 2025-06-19 RX ORDER — SULFAMETHOXAZOLE AND TRIMETHOPRIM 800; 160 MG/1; MG/1
TABLET ORAL
Qty: 30 TABLET | Refills: 3 | Status: SHIPPED | OUTPATIENT
Start: 2025-06-19

## 2025-06-19 RX ORDER — VIBEGRON 75 MG/1
1 TABLET, FILM COATED ORAL DAILY
Qty: 90 TABLET | Refills: 3 | Status: SHIPPED | OUTPATIENT
Start: 2025-06-19

## 2025-06-19 RX ORDER — TROSPIUM CHLORIDE ER 60 MG/1
CAPSULE ORAL
Qty: 90 CAPSULE | Refills: 3 | Status: SHIPPED | OUTPATIENT
Start: 2025-06-19

## 2025-06-19 NOTE — PATIENT INSTRUCTIONS
Plans to continue trospium ER 60 mg in am before breakfast on empty stomach  Gemtesa 75 mg in evening  Bactrim twice weekly on Wed and Sun  Urine order in system to do in Dec when she does her blood work  Follow up one year, PVR    Nurse line [phone]

## 2025-06-19 NOTE — PROGRESS NOTES
06/19/25   [id number]    Follow up urgency, urge incontinence, nocturia, hx dementia     Subjective      HPI Nedra Quiles is a 89 y.o. female who presents for follow up urgency, urge incontinence, nocturia, dementia.  Last seen 6/20/24;     No UTIs, has stayed on bactrim twice weekly    Managed w combination Gemtesa 75 mg in evening, trospium ER 60 mg daily in a.m. atleast > 50 % better taking the two OAB meds per patient; occasional leakage during the day, sleeps all night, wakes up wet, occasional bed change, discussed night time elevation legs, monitor fluids;    Bactrim twice weekly for UTI prevention  2/28/25 creatinine 1.0, GFR > 60  Urine ordered last visit, not completed    No UTIs, no hematuria, missed hat today, PVR 51 ml    Memory a little slower in past year able to do mathematics backwards and count back from 100 per daughter;     URBANO 10/25/21: Moderate-sized exophytic upper pole left renal cyst is slightly larger today. There are 2 subcentimeter cysts in the right kidney that are stable.     PMH, PSH, SH, FH reviewed.  PMH: Parkinson's Disease, Arthritis, Constipation, Reflux, Dementia  PSH: Cystocele repair, MUS  FH: no cancer  SH: non smoker      12/1/23 creatinine 0.93, GFR 60  10/25/21 URBANO exophytic L renal cyst, two R renal cysts stable, no stones, no hydro       Recap from 12/13/22 visit for detailed history  Ms. Quiles is a 86 y/o female here with long hx urinary incontinence previously managed by Dr. No w Trospium ER 60 mg 6 pm in evening daily and for recurrent UTIs taking Bactrim DS twice per week; Discontinued Terazosin 2 mg daily as incontinence hadn't improved; Has seen Dr. Cain in past for [place], mid urethral sling placement, cystoscopy, perineorrhaphy. Cystocele repair; Denies any recurrence of prolapse. Last visit as she noted her biggest issue was at night, we changed timing of Trospium to morning and started her on Myrbetriq 25 mg daily w supper which helped but patient  "called and said she needed higher dosage; This combination was working well but then insurance didn't cover the Myrbetriq but would cover Gemtesa, we gave her samples of this medication and she is here today for f/u.  cc this 8 a.m. Further discussion, patient had switched timing of medications and was now taking both medications Trospium and Gemtesa in evening before bedtime, No side effects. No UTIs or hematuria. Only up 1-2 x at night, DTF 4-5 x, no leakage, Still 60-70% better than prior to starting medications. Would like to continue on the Trospium and Myrbetriq w concerns for confusion with history of dementia and Beer's criteria;        Objective     /80   Pulse 64   Temp 36.9 °C (98.4 °F)   Ht 1.549 m (5' 1\")   Wt (!) 43.1 kg (95 lb)   BMI 17.95 kg/m²    Physical Exam  General: Appears comfortable and in no apparent distress, well nourished  Head: Normocephalic, atraumatic  Neck: trachea midline  Respiratory: respirations unlabored, no wheezes, and no use of accessory muscles  Cardiovascular: at rest no dyspnea, well perfused  Skin: no visible rashes or lesions  Neurologic: grossly intact, oriented to person, place, and time  Psychiatric: mood and affect appropriate  Musculoskeletal: in chair for appt. no difficulty w upper body movement      Assessment/Plan   Problem List Items Addressed This Visit          Genitourinary and Reproductive    Bladder instability    Relevant Medications    trospium (Sanctura XR) 60 mg 24 hour capsule    Nocturia    Relevant Orders    Post-Void Residual (Completed)    Urinalysis with Reflex Culture and Microscopic    Urge incontinence of urine - Primary    Relevant Medications    vibegron (Gemtesa) 75 mg tablet    Other Relevant Orders    Post-Void Residual (Completed)    Urinalysis with Reflex Culture and Microscopic    Urgency of urination    Relevant Orders    Post-Void Residual (Completed)    Urinalysis with Reflex Culture and Microscopic     Other Visit " Diagnoses         Recurrent UTI        Relevant Medications    sulfamethoxazole-trimethoprim (Bactrim DS) 800-160 mg tablet            Orders Placed This Encounter   Procedures    Post-Void Residual    Urinalysis with Reflex Culture and Microscopic     Standing Status:   Future     Number of Occurrences:   1     Expected Date:   6/19/2025     Expiration Date:   6/19/2026     Release result to [employer]:   Immediate [1]      Plans to continue trospium ER 60 mg in am before breakfast on empty stomach  Gemtesa 75 mg in evening  Bactrim twice weekly on Wed and Sun  Urine order in system to do in Dec when she does her blood work  Follow up one year, PVR    Nurse line [phone]       Dia Solis, APRN-CNP  Lab Results   Component Value Date    GLUCOSE 91 12/03/2024    CALCIUM 9.7 12/03/2024     12/03/2024    K 4.8 12/03/2024    CO2 35 (H) 12/03/2024     12/03/2024    BUN 32 (H) 12/03/2024    CREATININE 1.18 (H) 12/03/2024

## 2025-06-25 ENCOUNTER — TELEPHONE (OUTPATIENT)
Dept: UROLOGY | Facility: CLINIC | Age: 89
End: 2025-06-25
Payer: MEDICARE

## 2025-06-25 NOTE — TELEPHONE ENCOUNTER
PA for Gemtesa approved!    Outcome  Approved today by [employer]  2017  CaseId:[id number];Status:Approved;Review Type:Prior Auth;Coverage Start Date:05/26/2025;Coverage End Date:12/31/2099;  Effective Date: 5/26/2025  Authorization Expiration Date: 12/31/2099

## 2025-08-14 ENCOUNTER — TELEPHONE (OUTPATIENT)
Dept: PRIMARY CARE | Facility: CLINIC | Age: 89
End: 2025-08-14
Payer: MEDICARE

## 2025-11-04 ENCOUNTER — APPOINTMENT (OUTPATIENT)
Dept: PRIMARY CARE | Facility: CLINIC | Age: 89
End: 2025-11-04
Payer: MEDICARE

## 2026-06-23 ENCOUNTER — APPOINTMENT (OUTPATIENT)
Dept: UROLOGY | Facility: CLINIC | Age: 89
End: 2026-06-23
Payer: MEDICARE